# Patient Record
Sex: FEMALE | Race: WHITE | Employment: PART TIME
[De-identification: names, ages, dates, MRNs, and addresses within clinical notes are randomized per-mention and may not be internally consistent; named-entity substitution may affect disease eponyms.]

---

## 2017-01-25 ENCOUNTER — OFFICE VISIT (OUTPATIENT)
Dept: OBGYN | Facility: CLINIC | Age: 37
End: 2017-01-25

## 2017-01-25 VITALS
HEIGHT: 63 IN | DIASTOLIC BLOOD PRESSURE: 70 MMHG | WEIGHT: 120 LBS | BODY MASS INDEX: 21.26 KG/M2 | SYSTOLIC BLOOD PRESSURE: 118 MMHG

## 2017-01-25 DIAGNOSIS — N89.8 VAGINAL IRRITATION: Primary | ICD-10-CM

## 2017-01-25 PROCEDURE — 99214 OFFICE O/P EST MOD 30 MIN: CPT | Performed by: OBSTETRICS & GYNECOLOGY

## 2017-01-25 RX ORDER — CHOLECALCIFEROL (VITAMIN D3) 1250 MCG
1 CAPSULE ORAL WEEKLY
COMMUNITY

## 2017-01-25 ASSESSMENT — PATIENT HEALTH QUESTIONNAIRE - PHQ9
1. LITTLE INTEREST OR PLEASURE IN DOING THINGS: 1
SUM OF ALL RESPONSES TO PHQ9 QUESTIONS 1 & 2: 2
SUM OF ALL RESPONSES TO PHQ QUESTIONS 1-9: 2
2. FEELING DOWN, DEPRESSED OR HOPELESS: 1

## 2017-08-18 ENCOUNTER — TELEPHONE (OUTPATIENT)
Dept: OBGYN CLINIC | Age: 37
End: 2017-08-18

## 2017-08-22 ENCOUNTER — OFFICE VISIT (OUTPATIENT)
Dept: OBGYN CLINIC | Age: 37
End: 2017-08-22
Payer: COMMERCIAL

## 2017-08-22 VITALS
DIASTOLIC BLOOD PRESSURE: 82 MMHG | SYSTOLIC BLOOD PRESSURE: 104 MMHG | BODY MASS INDEX: 20.48 KG/M2 | HEIGHT: 63 IN | WEIGHT: 115.6 LBS

## 2017-08-22 DIAGNOSIS — N93.0 POSTCOITAL BLEEDING: Primary | ICD-10-CM

## 2017-08-22 PROCEDURE — 99212 OFFICE O/P EST SF 10 MIN: CPT | Performed by: NURSE PRACTITIONER

## 2017-08-29 ENCOUNTER — OFFICE VISIT (OUTPATIENT)
Dept: OBGYN CLINIC | Age: 37
End: 2017-08-29
Payer: COMMERCIAL

## 2017-08-29 VITALS
SYSTOLIC BLOOD PRESSURE: 114 MMHG | DIASTOLIC BLOOD PRESSURE: 82 MMHG | WEIGHT: 114.8 LBS | BODY MASS INDEX: 19.6 KG/M2 | HEIGHT: 64 IN

## 2017-08-29 DIAGNOSIS — Z01.419 ENCOUNTER FOR GYNECOLOGICAL EXAMINATION WITHOUT ABNORMAL FINDING: Primary | ICD-10-CM

## 2017-08-29 LAB — PAP SMEAR: NORMAL

## 2017-08-29 PROCEDURE — 99395 PREV VISIT EST AGE 18-39: CPT | Performed by: NURSE PRACTITIONER

## 2017-08-29 ASSESSMENT — ENCOUNTER SYMPTOMS
ABDOMINAL DISTENTION: 0
ABDOMINAL PAIN: 0
SHORTNESS OF BREATH: 0
CONSTIPATION: 0
COUGH: 0
DIARRHEA: 0
BACK PAIN: 0

## 2017-09-07 DIAGNOSIS — Z01.419 ENCOUNTER FOR GYNECOLOGICAL EXAMINATION WITHOUT ABNORMAL FINDING: ICD-10-CM

## 2017-09-12 ENCOUNTER — PROCEDURE VISIT (OUTPATIENT)
Dept: OBGYN CLINIC | Age: 37
End: 2017-09-12
Payer: COMMERCIAL

## 2017-09-12 DIAGNOSIS — N93.0 POSTCOITAL BLEEDING: ICD-10-CM

## 2017-09-12 PROCEDURE — 76830 TRANSVAGINAL US NON-OB: CPT | Performed by: OBSTETRICS & GYNECOLOGY

## 2017-09-12 PROCEDURE — 76856 US EXAM PELVIC COMPLETE: CPT | Performed by: OBSTETRICS & GYNECOLOGY

## 2017-09-19 ENCOUNTER — INITIAL CONSULT (OUTPATIENT)
Dept: OBGYN CLINIC | Age: 37
End: 2017-09-19
Payer: COMMERCIAL

## 2017-09-19 VITALS
WEIGHT: 118 LBS | DIASTOLIC BLOOD PRESSURE: 60 MMHG | BODY MASS INDEX: 20.14 KG/M2 | HEIGHT: 64 IN | SYSTOLIC BLOOD PRESSURE: 112 MMHG

## 2017-09-19 DIAGNOSIS — Z30.09 GENERAL COUNSELING AND ADVICE FOR CONTRACEPTIVE MANAGEMENT: Primary | ICD-10-CM

## 2017-09-19 DIAGNOSIS — N92.0 MENORRHAGIA WITH REGULAR CYCLE: ICD-10-CM

## 2017-09-19 PROCEDURE — 99213 OFFICE O/P EST LOW 20 MIN: CPT | Performed by: OBSTETRICS & GYNECOLOGY

## 2017-09-19 ASSESSMENT — ENCOUNTER SYMPTOMS
NAUSEA: 0
CONSTIPATION: 0
ABDOMINAL PAIN: 0
ORTHOPNEA: 0
DOUBLE VISION: 0
COUGH: 0
DIARRHEA: 0
VOMITING: 0
HEARTBURN: 0
WHEEZING: 0
BLURRED VISION: 0

## 2017-12-07 ENCOUNTER — OFFICE VISIT (OUTPATIENT)
Dept: OBGYN CLINIC | Age: 37
End: 2017-12-07
Payer: COMMERCIAL

## 2017-12-07 VITALS
HEIGHT: 64 IN | WEIGHT: 117 LBS | SYSTOLIC BLOOD PRESSURE: 130 MMHG | BODY MASS INDEX: 19.97 KG/M2 | DIASTOLIC BLOOD PRESSURE: 70 MMHG

## 2017-12-07 DIAGNOSIS — N76.0 ACUTE VAGINITIS: ICD-10-CM

## 2017-12-07 DIAGNOSIS — Z20.2 STD EXPOSURE: Primary | ICD-10-CM

## 2017-12-07 PROCEDURE — 99213 OFFICE O/P EST LOW 20 MIN: CPT | Performed by: OBSTETRICS & GYNECOLOGY

## 2017-12-08 ENCOUNTER — TELEPHONE (OUTPATIENT)
Dept: OBGYN CLINIC | Age: 37
End: 2017-12-08

## 2017-12-08 DIAGNOSIS — N76.0 ACUTE VAGINITIS: ICD-10-CM

## 2017-12-08 NOTE — TELEPHONE ENCOUNTER
gyn patient calling for result of vaginal culture from yesterday. Advised her no results back yet as her insurance must use [facility] and this can delay results. She wonders if \" cream\" could be called in while awaiting rsults as she is \" irritated\" on the outside .  Pharmacy confirmed patient number [phone]

## 2017-12-09 DIAGNOSIS — B37.31 YEAST VAGINITIS: Primary | ICD-10-CM

## 2017-12-09 RX ORDER — FLUCONAZOLE 150 MG/1
150 TABLET ORAL ONCE
Qty: 1 TABLET | Refills: 2 | Status: SHIPPED | OUTPATIENT
Start: 2017-12-09 | End: 2017-12-09

## 2017-12-11 ENCOUNTER — TELEPHONE (OUTPATIENT)
Dept: OBGYN CLINIC | Age: 37
End: 2017-12-11

## 2017-12-11 NOTE — TELEPHONE ENCOUNTER
The patient only took the [address] yesterday, so she might still see some some results from that. I also suggest that I need to see the rest of her labs which are not back yet, so I will wait and check back in with her after the other labs return.

## 2017-12-12 DIAGNOSIS — Z20.2 STD EXPOSURE: ICD-10-CM

## 2017-12-28 ENCOUNTER — INITIAL CONSULT (OUTPATIENT)
Dept: OBGYN CLINIC | Age: 37
End: 2017-12-28
Payer: COMMERCIAL

## 2017-12-28 VITALS
SYSTOLIC BLOOD PRESSURE: 116 MMHG | DIASTOLIC BLOOD PRESSURE: 70 MMHG | BODY MASS INDEX: 19.46 KG/M2 | HEIGHT: 64 IN | WEIGHT: 114 LBS

## 2017-12-28 DIAGNOSIS — N94.6 MENORRHALGIA: Primary | ICD-10-CM

## 2017-12-28 PROCEDURE — 99213 OFFICE O/P EST LOW 20 MIN: CPT | Performed by: OBSTETRICS & GYNECOLOGY

## 2017-12-28 NOTE — PROGRESS NOTES
Number of children: N/A    Years of education: N/A     Occupational History    Not on file. Social History Main Topics    Smoking status: Former Smoker    Smokeless tobacco: Never Used    Alcohol use No    Drug use: No    Sexual activity: No     Other Topics Concern    Not on file     Social History Narrative    No narrative on file         MEDICATIONS:  Current Outpatient Prescriptions   Medication Sig Dispense Refill    NONFORMULARY RUKHSANA 750mg      TAURINE PO Take by mouth      NONFORMULARY Honey capsuls      Ixekizumab (TALTZ SC) Inject into the skin      ST CARLOS WORT PO Take by mouth \"Positive Thoughts\"      Cholecalciferol (VITAMIN D3) 76051 UNITS CAPS Take 1 capsule by mouth once a week      ALPRAZolam (XANAX) 0.25 MG tablet       LORazepam (ATIVAN) 2 MG tablet Take 2 mg by mouth nightly      HYDROcodone-acetaminophen (NORCO) 7.5-325 MG per tablet   Take 1 tablet by mouth every 8 hours as needed Migraines       No current facility-administered medications for this visit. ALLERGIES:  Allergies as of 12/28/2017 - Review Complete 12/28/2017   Allergen Reaction Noted    Latex Hives 08/08/2012    Amoxicillin-pot clavulanate Hives 01/09/2012    Bactrim Hives 01/09/2012    Ciprofloxacin Hives 01/09/2012    Levofloxacin Hives 01/09/2012    Macrobid [nitrofurantoin monohydrate macrocrystals] Hives 01/09/2012         REVIEW OF SYSTEMS:  General appearance:Appears healthy. Alert; in no acute distress. Pleasant.   CARDIOVASCULAR: Denies: chest pain, dyspnea on exertion, palpitations  RESPIRATORY: Denies: cough, shortness of breath, wheezing  GI: Denies: abdominal pain, flank pain, nausea, vomiting, diarrhea  : Denies: dysuria, frequency/urgency, hematuria, pelvic pain;                                        MUSCULOSKELETAL: Denies: back pain, joint swelling, muscle pain  SKIN: Denies: rash or hives  NEUROLOGIC: Denies Headache, Migraines, CVA, TIA  HEMATOLOGIC: Denies Bleeding Disorder, Coagulopathy or Transfusion History    Blood pressure 116/70, height 5' 4\" (1.626 m), weight 114 lb (51.7 kg), last menstrual period 12/22/2017, not currently breastfeeding. General Appearance:  awake, alert, oriented, in no acute distress and well developed, well nourished  Skin:  Skin color, texture, turgor normal. No rashes or lesions. Mouth/Throat:  Mucosa moist.  No lesions. Pharynx without erythema, edema or exudate. Neck:  neck- supple, no mass, non-tender  Lungs:  Normal expansion. Clear to auscultation. No rales, rhonchi, or wheezing. Heart:  Heart sounds are normal.  Regular rate and rhythm without murmur, gallop or rub. Breast:  negative  Abdomen:  Soft, non-tender, normal bowel sounds. No bruits, organomegaly or masses. Extremities: Extremities warm to touch, pink, with no edema. Musculoskeletal:  negative  Peripheral Pulses:  Normal  Neurologic:  negative  Female Urogen:  External genitalia, vagina and cervix appear normal and without lesions. Bimanual exam: no adnexal masses, uterine enlargement or tenderness noted. Pap obtained previously. Diagnostics:  No results found.         Labs:  Results for orders placed or performed in visit on 09/07/17   PAP SMEAR   Result Value Ref Range    Pap Negative for intraephithelial lesion or malignancy Negative for intraephithelial lesion or malignancy, Other           ASSESSMENT:    1. Menorrhalgia           Patient Active Problem List    Diagnosis Date Noted    Psoriasis      Priority: High    Migraine headache 07/10/2012     Priority: Low     Takes Vicodin PRN      Osteoarthritis      Priority: Low    Elevated blood pressure reading 11/12/2014     Labetalol 200mg BID  Updating deleted diagnoses            Plan:  Novasure ablation, BPS by laparoscopy 1/2 at [facility]  Orders:    CBC:Yes                                                             NPO after midnight   Type & Screen:Yes  PAT Appointment:No  Anesthesia Protocol:

## 2017-12-29 ENCOUNTER — TELEPHONE (OUTPATIENT)
Dept: OBGYN CLINIC | Age: 37
End: 2017-12-29

## 2017-12-29 DIAGNOSIS — Z98.890 HISTORY OF CARDIAC RADIOFREQUENCY ABLATION: Primary | ICD-10-CM

## 2017-12-29 NOTE — TELEPHONE ENCOUNTER
Spoke with patient she is willing to travel if we can get echocardiogram done today. Spoke with daria at [address] scheduling she had to leave a message at [facility] . She then called [facility] to see if patient could be worked in today . They have absolutely no openings at [facility] or [facility] today. She did find an opening at 6 a m at [employer] . Patient to arrive at 7:30 am at er entrance [facility] wear comfortable clothes .  Patient notified and order faxed to [phone] with instructions to read ASAP as patient has surgery 01/02/18

## 2018-01-02 DIAGNOSIS — Z98.890 HISTORY OF CARDIAC RADIOFREQUENCY ABLATION: ICD-10-CM

## 2018-01-02 DIAGNOSIS — Z20.2 STD EXPOSURE: ICD-10-CM

## 2018-01-04 DIAGNOSIS — Z20.2 STD EXPOSURE: ICD-10-CM

## 2018-01-05 ENCOUNTER — TELEPHONE (OUTPATIENT)
Dept: OBGYN CLINIC | Age: 38
End: 2018-01-05

## 2018-01-05 NOTE — TELEPHONE ENCOUNTER
post op tubal ligation, ablation 01/02/18 with Dr [employer] calling with concern that one of her icisions is not closed like the other , denies drainage . Slightly red around this one incision. She did have a temperature of 100.1 this morning but states her dad is sicj=k with URI.  Post op appt 01/15/18 please advise [phone]

## 2018-01-08 ENCOUNTER — TELEPHONE (OUTPATIENT)
Dept: OBGYN CLINIC | Age: 38
End: 2018-01-08

## 2018-01-08 DIAGNOSIS — R30.0 DYSURIA: Primary | ICD-10-CM

## 2018-01-08 DIAGNOSIS — R30.0 DYSURIA: ICD-10-CM

## 2018-01-08 NOTE — TELEPHONE ENCOUNTER
Patient will need to be seen for an Affirm. Either tomorrow with one of the other providers or Wednesday with me.   Thanks

## 2018-01-08 NOTE — TELEPHONE ENCOUNTER
post op tubal ligation, ablation c/o irritation on the outside \" like a yeast infection\" states when the discharge hits the irritation on the outside of her vagina it hurts \" terrible\". Order faxed to [facility] lab so patient can collect [facility] urine .  Please advise [phone]

## 2018-01-09 ENCOUNTER — OFFICE VISIT (OUTPATIENT)
Dept: OBGYN CLINIC | Age: 38
End: 2018-01-09

## 2018-01-09 VITALS
SYSTOLIC BLOOD PRESSURE: 120 MMHG | HEIGHT: 64 IN | BODY MASS INDEX: 19.63 KG/M2 | DIASTOLIC BLOOD PRESSURE: 80 MMHG | WEIGHT: 115 LBS

## 2018-01-09 DIAGNOSIS — N89.8 VAGINAL DISCHARGE: Primary | ICD-10-CM

## 2018-01-09 PROCEDURE — 99024 POSTOP FOLLOW-UP VISIT: CPT | Performed by: OBSTETRICS & GYNECOLOGY

## 2018-01-09 RX ORDER — IBUPROFEN 800 MG/1
TABLET ORAL
Refills: 5 | COMMUNITY
Start: 2018-01-02 | End: 2018-01-09 | Stop reason: CLARIF

## 2018-01-09 RX ORDER — ALPRAZOLAM 1 MG/1
TABLET ORAL
Refills: 0 | COMMUNITY
Start: 2017-12-23

## 2018-01-09 RX ORDER — IBUPROFEN 800 MG/1
800 TABLET ORAL
COMMUNITY
Start: 2018-01-02 | End: 2018-02-01

## 2018-01-09 RX ORDER — MAGNESIUM GLUCONATE 30 MG(550)
30 TABLET ORAL
COMMUNITY

## 2018-01-09 ASSESSMENT — ENCOUNTER SYMPTOMS
HEARTBURN: 0
ABDOMINAL PAIN: 0
NAUSEA: 0
DIARRHEA: 0
WHEEZING: 0
DOUBLE VISION: 0
CONSTIPATION: 0
VOMITING: 0
BLURRED VISION: 0
ORTHOPNEA: 0
COUGH: 0

## 2018-01-09 NOTE — PROGRESS NOTES
[facility]  [facility]  DATE OF VISIT:  18    Joni Cunningham    :  1980  CHIEF COMPLAINT:    Chief Complaint   Patient presents with    Vaginitis     discharge and irritation        HPI :   Joni Cunningham is a 40 y.o. female here for vaginal discharge and itching. She underwent laparoscopic tubal ligation and endometrial ablation with Dr. Melani Li on 18. She had a urinalysis yesterday which appeared negative for infection. She reports vaginal discomfort as well. Past Medical History:   Diagnosis Date    Arthritis     History of vaginal bleeding     constant bled for 5 yrs cause unknown    Migraine headache 7/10/2012    Osteoarthritis     with rheamuatoid arthritis    Ovarian cyst     Psoriasis     Renal lithiasis     Hx of kidney stones    Tachycardia     acute tachycardia. Had 2 ablations    Thyroid disease     treated as teen.   No problem since then    Varicosities                                                                    Past Surgical History:   Procedure Laterality Date    APPENDECTOMY      CARDIAC SURGERY      x 2 for tacycardia resting heart rate now      SECTION, LOW TRANSVERSE  9/15/12    FOOT SURGERY      for osteoarthritis metal plates in both feet    LAPAROSCOPY      ? endometriosis for constant vaginal bleeding    LAPAROSCOPY  2018    crystal partial salpingectomy, NovaSure ablation, hysteroscopy     Family History   Problem Relation Age of Onset    Hypertension Paternal Grandmother     Hypertension Father     High Cholesterol Father     Stroke Father     Heart Disease Father     Other Father      bladder issues, prostates    Hypertension Mother     Depression Mother     Thyroid Disease Mother     Cancer Other      thyroid mets    Other Maternal Grandfather      blood disorder     History   Smoking Status    Former Smoker   Smokeless Tobacco    Never Used     History   Alcohol Use No     Current Outpatient Prescriptions   Medication Sig Dispense Refill    ibuprofen (ADVIL;MOTRIN) 800 MG tablet Take 800 mg by mouth      Ixekizumab 80 MG/ML SOAJ Inject into the skin      Magnesium Gluconate 550 MG TABS Take 30 mg by mouth      Taurine 1000 MG CAPS Take by mouth      ALPRAZolam (XANAX) 1 MG tablet TK 1 AND 1/2 TO 2 TS PO QID  0    NONFORMULARY RUKHSANA 750mg      NONFORMULARY Honey capsuls      ST CARLOS WORT PO Take by mouth \"Positive Thoughts\"      Cholecalciferol (VITAMIN D3) 13885 UNITS CAPS Take 1 capsule by mouth once a week      LORazepam (ATIVAN) 2 MG tablet Take 2 mg by mouth nightly      HYDROcodone-acetaminophen (NORCO) 7.5-325 MG per tablet   Take 1 tablet by mouth every 8 hours as needed Migraines       No current facility-administered medications for this visit. Allergies:  Latex; Amoxicillin-pot clavulanate; Bactrim; Ciprofloxacin; Levofloxacin; and Macrobid [nitrofurantoin monohydrate macrocrystals]    Gynecologic History:  Patient's last menstrual period was 2017 (exact date). Sexually Active: Yes  STD History: No      Obstetric History       T2      L2     SAB0   TAB0   Ectopic0   Molar0   Multiple0   Live Births2        Review of Systems   Constitutional: Negative for chills, fever and weight loss. HENT: Negative for hearing loss. Eyes: Negative for blurred vision and double vision. Respiratory: Negative for cough and wheezing. Cardiovascular: Negative for chest pain, palpitations and orthopnea. Gastrointestinal: Negative for abdominal pain, constipation, diarrhea, heartburn, nausea and vomiting. Genitourinary: Negative for dysuria, frequency and urgency. Musculoskeletal: Negative for joint pain and myalgias. Skin: Negative for rash. Neurological: Negative for dizziness, tingling, focal weakness, weakness and headaches. Endo/Heme/Allergies: Does not bruise/bleed easily.    Psychiatric/Behavioral: Negative for depression, substance abuse

## 2018-01-10 DIAGNOSIS — N89.8 VAGINAL DISCHARGE: ICD-10-CM

## 2018-01-10 RX ORDER — FLUCONAZOLE 150 MG/1
150 TABLET ORAL ONCE
Qty: 3 TABLET | Refills: 0 | Status: SHIPPED | OUTPATIENT
Start: 2018-01-10 | End: 2018-01-10

## 2018-01-10 RX ORDER — METRONIDAZOLE 500 MG/1
500 TABLET ORAL 2 TIMES DAILY
Qty: 14 TABLET | Refills: 0 | Status: SHIPPED | OUTPATIENT
Start: 2018-01-10 | End: 2018-05-24 | Stop reason: SDUPTHER

## 2018-01-18 ENCOUNTER — OFFICE VISIT (OUTPATIENT)
Dept: OBGYN CLINIC | Age: 38
End: 2018-01-18

## 2018-01-18 VITALS
SYSTOLIC BLOOD PRESSURE: 110 MMHG | BODY MASS INDEX: 19.43 KG/M2 | WEIGHT: 113.8 LBS | HEIGHT: 64 IN | DIASTOLIC BLOOD PRESSURE: 60 MMHG

## 2018-01-18 DIAGNOSIS — Z09 POSTOP CHECK: Primary | ICD-10-CM

## 2018-01-18 PROCEDURE — 99024 POSTOP FOLLOW-UP VISIT: CPT | Performed by: OBSTETRICS & GYNECOLOGY
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
## 2018-01-22 ENCOUNTER — TELEPHONE (OUTPATIENT)
Dept: OBGYN CLINIC | Age: 38
End: 2018-01-22

## 2018-03-15 ENCOUNTER — TELEPHONE (OUTPATIENT)
Dept: OBGYN CLINIC | Age: 38
End: 2018-03-15

## 2018-03-16 ENCOUNTER — OFFICE VISIT (OUTPATIENT)
Dept: OBGYN CLINIC | Age: 38
End: 2018-03-16
Payer: COMMERCIAL

## 2018-03-16 VITALS
HEIGHT: 64 IN | BODY MASS INDEX: 20.32 KG/M2 | WEIGHT: 119 LBS | DIASTOLIC BLOOD PRESSURE: 84 MMHG | SYSTOLIC BLOOD PRESSURE: 138 MMHG

## 2018-03-16 DIAGNOSIS — R19.09 LUMP IN THE GROIN: Primary | ICD-10-CM

## 2018-03-16 PROCEDURE — 99213 OFFICE O/P EST LOW 20 MIN: CPT | Performed by: NURSE PRACTITIONER

## 2018-03-16 NOTE — PROGRESS NOTES
S-  Here for painful lump in right groin that she noticed about 4 days ago. It is painful to touch and when she puts pressure on it or steps down with any force. It has not changed in size or character and she has not used anything for pain relief. She admits to having \"cold-like\" symptoms for a week or so. O-  Physical Exam   Constitutional: She is oriented to person, place, and time. She appears well-developed and well-nourished. HENT:   Head: Normocephalic and atraumatic. Eyes: Conjunctivae and EOM are normal.   Neck: Normal range of motion. Neck supple. Cardiovascular: Normal rate and regular rhythm. Pulmonary/Chest: Effort normal and breath sounds normal.   Abdominal: Soft. Musculoskeletal: Normal range of motion. Lymphadenopathy:     She has no cervical adenopathy. Neurological: She is alert and oriented to person, place, and time. Skin: Skin is warm and dry. Psychiatric: She has a normal mood and affect. Her behavior is normal. Judgment and thought content normal.     A-  Patient is a 40 y.o. Belinda Page  seen with total face to face time of 15 minutes. More than 50% of this visit was on counseling and education regarding her   1. Lump in the groin      and her options. She was also counseled on her preventative health maintenance recommendations and follow-up of annual exam. Refer to plan and assessment.     Recent Results (from the past 8736 hour(s))   PAP SMEAR    Collection Time: 08/29/17 12:00 AM   Result Value Ref Range    Pap Negative for intraephithelial lesion or malignancy Negative for intraephithelial lesion or malignancy, Other       P-  Monitor for changes in size, character, increase in pain  May consider US with changes  Warm compresses and ibuprofen for comfort  FU PCP for worsening cold symptoms  RTO annual exam and prn

## 2018-05-16 ENCOUNTER — OFFICE VISIT (OUTPATIENT)
Dept: OBGYN CLINIC | Age: 38
End: 2018-05-16
Payer: COMMERCIAL

## 2018-05-16 VITALS
BODY MASS INDEX: 21.44 KG/M2 | SYSTOLIC BLOOD PRESSURE: 118 MMHG | WEIGHT: 121 LBS | HEIGHT: 63 IN | DIASTOLIC BLOOD PRESSURE: 78 MMHG

## 2018-05-16 DIAGNOSIS — N76.0 ACUTE VAGINITIS: Primary | ICD-10-CM

## 2018-05-16 PROCEDURE — 99213 OFFICE O/P EST LOW 20 MIN: CPT | Performed by: NURSE PRACTITIONER

## 2018-05-17 DIAGNOSIS — N76.0 ACUTE VAGINITIS: ICD-10-CM

## 2018-05-21 ENCOUNTER — TELEPHONE (OUTPATIENT)
Dept: OBGYN CLINIC | Age: 38
End: 2018-05-21

## 2018-05-23 ENCOUNTER — OFFICE VISIT (OUTPATIENT)
Dept: OBGYN CLINIC | Age: 38
End: 2018-05-23
Payer: COMMERCIAL

## 2018-05-23 VITALS — WEIGHT: 124.6 LBS | DIASTOLIC BLOOD PRESSURE: 70 MMHG | SYSTOLIC BLOOD PRESSURE: 102 MMHG | BODY MASS INDEX: 22.07 KG/M2

## 2018-05-23 DIAGNOSIS — N89.8 VAGINAL ODOR: Primary | ICD-10-CM

## 2018-05-23 PROCEDURE — 99213 OFFICE O/P EST LOW 20 MIN: CPT | Performed by: NURSE PRACTITIONER

## 2018-05-24 DIAGNOSIS — N89.8 VAGINAL ODOR: ICD-10-CM

## 2018-05-24 RX ORDER — METRONIDAZOLE 500 MG/1
500 TABLET ORAL 2 TIMES DAILY
Qty: 14 TABLET | Refills: 0 | Status: SHIPPED | OUTPATIENT
Start: 2018-05-24 | End: 2018-05-31

## 2018-05-25 DIAGNOSIS — N89.8 VAGINAL ODOR: ICD-10-CM

## 2018-05-29 ENCOUNTER — TELEPHONE (OUTPATIENT)
Dept: OBGYN CLINIC | Age: 38
End: 2018-05-29

## 2018-05-29 RX ORDER — METRONIDAZOLE 7.5 MG/G
GEL VAGINAL
Qty: 1 TUBE | Refills: 0 | Status: SHIPPED | OUTPATIENT
Start: 2018-05-29 | End: 2018-06-05

## 2018-06-01 ENCOUNTER — OFFICE VISIT (OUTPATIENT)
Dept: OBGYN CLINIC | Age: 38
End: 2018-06-01
Payer: COMMERCIAL

## 2018-06-01 ENCOUNTER — TELEPHONE (OUTPATIENT)
Dept: OBGYN CLINIC | Age: 38
End: 2018-06-01

## 2018-06-01 VITALS
DIASTOLIC BLOOD PRESSURE: 64 MMHG | BODY MASS INDEX: 21.58 KG/M2 | HEIGHT: 63 IN | WEIGHT: 121.8 LBS | SYSTOLIC BLOOD PRESSURE: 102 MMHG

## 2018-06-01 DIAGNOSIS — N76.0 ACUTE VAGINITIS: ICD-10-CM

## 2018-06-01 DIAGNOSIS — R82.90 ABNORMAL URINE ODOR: Primary | ICD-10-CM

## 2018-06-01 PROCEDURE — 99213 OFFICE O/P EST LOW 20 MIN: CPT | Performed by: NURSE PRACTITIONER

## 2018-06-04 ENCOUNTER — TELEPHONE (OUTPATIENT)
Dept: OBGYN CLINIC | Age: 38
End: 2018-06-04

## 2018-06-04 DIAGNOSIS — N76.0 ACUTE VAGINITIS: ICD-10-CM

## 2018-06-04 DIAGNOSIS — R82.90 ABNORMAL URINE ODOR: ICD-10-CM

## 2018-06-13 ENCOUNTER — OFFICE VISIT (OUTPATIENT)
Dept: OBGYN CLINIC | Age: 38
End: 2018-06-13
Payer: COMMERCIAL

## 2018-06-13 VITALS
SYSTOLIC BLOOD PRESSURE: 118 MMHG | BODY MASS INDEX: 21.44 KG/M2 | WEIGHT: 121 LBS | DIASTOLIC BLOOD PRESSURE: 60 MMHG | HEIGHT: 63 IN

## 2018-06-13 DIAGNOSIS — N39.0 URINARY TRACT INFECTION WITHOUT HEMATURIA, SITE UNSPECIFIED: Primary | ICD-10-CM

## 2018-06-13 PROCEDURE — 99212 OFFICE O/P EST SF 10 MIN: CPT | Performed by: NURSE PRACTITIONER

## 2018-06-19 LAB
BILIRUBIN URINE: 0 MG/DL
BLOOD, URINE: NEGATIVE
CLARITY: NORMAL
COLOR: NORMAL
GLUCOSE URINE: NEGATIVE
KETONES, URINE: NEGATIVE
LEUKOCYTE ESTERASE, URINE: NEGATIVE
NITRITE, URINE: NEGATIVE
PH UA: 6.5 (ref 4.5–8)
PROTEIN UA: NEGATIVE
SPECIFIC GRAVITY UA: 1.03 (ref 1–1.03)
UROBILINOGEN, URINE: NORMAL

## 2018-06-21 DIAGNOSIS — N39.0 URINARY TRACT INFECTION WITHOUT HEMATURIA, SITE UNSPECIFIED: ICD-10-CM

## 2018-06-28 ENCOUNTER — OFFICE VISIT (OUTPATIENT)
Dept: OBGYN CLINIC | Age: 38
End: 2018-06-28
Payer: COMMERCIAL

## 2018-06-28 VITALS
DIASTOLIC BLOOD PRESSURE: 86 MMHG | HEIGHT: 61 IN | BODY MASS INDEX: 24.24 KG/M2 | WEIGHT: 128.4 LBS | SYSTOLIC BLOOD PRESSURE: 100 MMHG

## 2018-06-28 DIAGNOSIS — N76.0 ACUTE VAGINITIS: Primary | ICD-10-CM

## 2018-06-28 LAB
BILIRUBIN URINE: 0 MG/DL
BLOOD, URINE: NEGATIVE
CLARITY: CLEAR
COLOR: YELLOW
GLUCOSE URINE: NEGATIVE
KETONES, URINE: NEGATIVE
LEUKOCYTE ESTERASE, URINE: NEGATIVE
NITRITE, URINE: NEGATIVE
PH UA: 6 (ref 4.5–8)
PROTEIN UA: NEGATIVE
SPECIFIC GRAVITY UA: 1.01 (ref 1–1.03)
UROBILINOGEN, URINE: NORMAL

## 2018-06-28 PROCEDURE — 81003 URINALYSIS AUTO W/O SCOPE: CPT | Performed by: NURSE PRACTITIONER

## 2018-06-28 PROCEDURE — 99213 OFFICE O/P EST LOW 20 MIN: CPT | Performed by: NURSE PRACTITIONER

## 2018-06-29 DIAGNOSIS — N76.0 ACUTE VAGINITIS: ICD-10-CM

## 2018-06-29 RX ORDER — FLUCONAZOLE 150 MG/1
150 TABLET ORAL ONCE
Qty: 1 TABLET | Refills: 0 | Status: SHIPPED | OUTPATIENT
Start: 2018-06-29 | End: 2018-07-02 | Stop reason: SDUPTHER

## 2018-07-02 ENCOUNTER — TELEPHONE (OUTPATIENT)
Dept: OBGYN CLINIC | Age: 38
End: 2018-07-02

## 2018-07-02 RX ORDER — FLUCONAZOLE 150 MG/1
150 TABLET ORAL ONCE
Qty: 1 TABLET | Refills: 0 | Status: SHIPPED | OUTPATIENT
Start: 2018-07-02 | End: 2018-07-02

## 2018-07-02 NOTE — TELEPHONE ENCOUNTER
I called and let Mani Mcclure know that another diflucan was sent along with a cream and gave her directions on use.

## 2018-07-02 NOTE — TELEPHONE ENCOUNTER
I sent another diflucan and also some kenalog cream that she can use twice daily x 7 days then once daily if still bothering her

## 2018-07-06 ENCOUNTER — TELEPHONE (OUTPATIENT)
Dept: OBGYN CLINIC | Age: 38
End: 2018-07-06

## 2018-07-06 RX ORDER — ESTRADIOL 0.1 MG/G
1 CREAM VAGINAL DAILY
Qty: 1 TUBE | Refills: 3 | Status: SHIPPED | OUTPATIENT
Start: 2018-07-06 | End: 2018-10-15 | Stop reason: ALTCHOICE

## 2018-07-06 NOTE — TELEPHONE ENCOUNTER
I sent estrace vaginal cream.  If not better in 3-4 weeks, she needs to be seen in the office.   She may continue to use the kenalog

## 2018-08-02 ENCOUNTER — TELEPHONE (OUTPATIENT)
Dept: OBGYN CLINIC | Age: 38
End: 2018-08-02

## 2018-08-02 NOTE — TELEPHONE ENCOUNTER
Pt has quite a bit of itching, burning. All of this is internal. It has just started within the past 3 days. She also stated that she needs at least 2-3 Diflucan to completely knock out a yeast inf when she does get them. She asked for them to be called in if possible to the [facility] on [address] advise pt plan of care.

## 2018-08-03 RX ORDER — FLUCONAZOLE 150 MG/1
150 TABLET ORAL
Qty: 2 TABLET | Refills: 0 | Status: SHIPPED | OUTPATIENT
Start: 2018-08-03 | End: 2018-09-12 | Stop reason: SDUPTHER

## 2018-08-30 ENCOUNTER — OFFICE VISIT (OUTPATIENT)
Dept: OBGYN CLINIC | Age: 38
End: 2018-08-30
Payer: COMMERCIAL

## 2018-08-30 VITALS
DIASTOLIC BLOOD PRESSURE: 84 MMHG | WEIGHT: 129.2 LBS | BODY MASS INDEX: 24.39 KG/M2 | HEIGHT: 61 IN | SYSTOLIC BLOOD PRESSURE: 122 MMHG

## 2018-08-30 DIAGNOSIS — N89.8 VAGINA ITCHING: Primary | ICD-10-CM

## 2018-08-30 PROCEDURE — 99213 OFFICE O/P EST LOW 20 MIN: CPT | Performed by: NURSE PRACTITIONER

## 2018-09-04 ENCOUNTER — TELEPHONE (OUTPATIENT)
Dept: OBGYN CLINIC | Age: 38
End: 2018-09-04

## 2018-09-04 DIAGNOSIS — N89.8 VAGINA ITCHING: ICD-10-CM

## 2018-09-04 RX ORDER — FLUCONAZOLE 150 MG/1
150 TABLET ORAL ONCE
Qty: 1 TABLET | Refills: 0 | Status: SHIPPED | OUTPATIENT
Start: 2018-09-04 | End: 2018-09-04

## 2018-09-04 NOTE — TELEPHONE ENCOUNTER
Pt calling for test request and notified pt that yeast+. Pt states one Diflucan is never enough for her. Pt requesting to have 2 or 3 called in for treatment.

## 2018-09-06 ENCOUNTER — TELEPHONE (OUTPATIENT)
Dept: OBGYN CLINIC | Age: 38
End: 2018-09-06

## 2018-09-06 RX ORDER — FLUCONAZOLE 150 MG/1
150 TABLET ORAL WEEKLY
Qty: 4 TABLET | Refills: 0 | Status: SHIPPED | OUTPATIENT
Start: 2018-09-06 | End: 2018-09-28

## 2018-09-12 ENCOUNTER — OFFICE VISIT (OUTPATIENT)
Dept: OBGYN CLINIC | Age: 38
End: 2018-09-12
Payer: COMMERCIAL

## 2018-09-12 VITALS
SYSTOLIC BLOOD PRESSURE: 118 MMHG | HEIGHT: 61 IN | BODY MASS INDEX: 23.9 KG/M2 | WEIGHT: 126.6 LBS | DIASTOLIC BLOOD PRESSURE: 80 MMHG

## 2018-09-12 DIAGNOSIS — B37.31 YEAST VAGINITIS: ICD-10-CM

## 2018-09-12 DIAGNOSIS — Z01.419 VISIT FOR GYNECOLOGIC EXAMINATION: Primary | ICD-10-CM

## 2018-09-12 PROCEDURE — 99395 PREV VISIT EST AGE 18-39: CPT | Performed by: OBSTETRICS & GYNECOLOGY

## 2018-09-12 ASSESSMENT — ENCOUNTER SYMPTOMS
DIARRHEA: 0
BACK PAIN: 0
COUGH: 0
SHORTNESS OF BREATH: 0
ABDOMINAL PAIN: 0
CONSTIPATION: 0
ABDOMINAL DISTENTION: 0
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
## 2018-09-14 DIAGNOSIS — B37.31 YEAST VAGINITIS: ICD-10-CM

## 2018-09-19 ENCOUNTER — TELEPHONE (OUTPATIENT)
Dept: OBGYN CLINIC | Age: 38
End: 2018-09-19

## 2018-09-19 DIAGNOSIS — N76.0 BV (BACTERIAL VAGINOSIS): Primary | ICD-10-CM

## 2018-09-19 DIAGNOSIS — B96.89 BV (BACTERIAL VAGINOSIS): Primary | ICD-10-CM

## 2018-09-19 RX ORDER — METRONIDAZOLE 7.5 MG/G
GEL VAGINAL
Qty: 1 TUBE | Refills: 0 | Status: SHIPPED | OUTPATIENT
Start: 2018-09-19 | End: 2018-09-26

## 2018-10-15 ENCOUNTER — PROCEDURE VISIT (OUTPATIENT)
Dept: OBGYN CLINIC | Age: 38
End: 2018-10-15
Payer: COMMERCIAL

## 2018-10-15 VITALS
HEIGHT: 61 IN | DIASTOLIC BLOOD PRESSURE: 72 MMHG | SYSTOLIC BLOOD PRESSURE: 110 MMHG | WEIGHT: 131.4 LBS | BODY MASS INDEX: 24.81 KG/M2

## 2018-10-15 DIAGNOSIS — R87.810 ASCUS WITH POSITIVE HIGH RISK HPV CERVICAL: Primary | ICD-10-CM

## 2018-10-15 DIAGNOSIS — R87.610 ASCUS WITH POSITIVE HIGH RISK HPV CERVICAL: Primary | ICD-10-CM

## 2018-10-15 DIAGNOSIS — N89.8 VAGINAL DISCHARGE: ICD-10-CM

## 2018-10-15 PROCEDURE — 57455 BIOPSY OF CERVIX W/SCOPE: CPT | Performed by: OBSTETRICS & GYNECOLOGY

## 2018-10-15 NOTE — PROGRESS NOTES
COLPOSCOPY PROCEDURE    INDICATIONS:  ASCUS pap  We will also do the ESTRELLITA for BV (she will be losing her insurance coverage b/o divorce)     HPV:   positive      Abnormal Cytology and Colposcopy History: No previous dysplasia    COLPOSCOPIC EXAMINATION:                Blood pressure 110/72, height 5' 1\" (1.549 m), weight 131 lb 6.4 oz (59.6 kg), not currently breastfeeding. Gross observations:  Wide white area at 6:00, some change at 1:00  Satisfactory: Yes     FINDINGS: 6:00 most likely acetowhite     ECC performed:  No     Acetic acid applied:   Yes       IMPRESSIONS: expect mild changes  Biopsy sites: 1:00, 6:00

## 2018-10-16 DIAGNOSIS — N89.8 VAGINAL DISCHARGE: ICD-10-CM

## 2018-10-19 DIAGNOSIS — R87.610 ASCUS WITH POSITIVE HIGH RISK HPV CERVICAL: ICD-10-CM

## 2018-10-19 DIAGNOSIS — R87.810 ASCUS WITH POSITIVE HIGH RISK HPV CERVICAL: ICD-10-CM

## 2018-10-29 ENCOUNTER — TELEPHONE (OUTPATIENT)
Dept: OBGYN CLINIC | Age: 38
End: 2018-10-29

## 2018-10-29 NOTE — TELEPHONE ENCOUNTER
patient calling for results of cervical biopsies. her voice mail is full. 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 She wonders when she should return for repeat pap 3 months or 6 months? She also wanted to let Dr Mary Anne Madsen know she is on an immuno suppressant \" talz\" every 30 day by injection for her psoriasis.  Wonders if this will affect her fighting off HPV     Please call her at [phone]

## 2019-02-14 ENCOUNTER — OFFICE VISIT (OUTPATIENT)
Dept: OBGYN CLINIC | Age: 39
End: 2019-02-14
Payer: COMMERCIAL

## 2019-02-14 VITALS
WEIGHT: 131 LBS | HEIGHT: 61 IN | DIASTOLIC BLOOD PRESSURE: 84 MMHG | SYSTOLIC BLOOD PRESSURE: 122 MMHG | BODY MASS INDEX: 24.73 KG/M2

## 2019-02-14 DIAGNOSIS — N63.23 BREAST LUMP ON LEFT SIDE AT 4 O'CLOCK POSITION: ICD-10-CM

## 2019-02-14 DIAGNOSIS — N63.20 LEFT BREAST LUMP: Primary | ICD-10-CM

## 2019-02-14 DIAGNOSIS — R82.90 ABNORMAL URINE ODOR: ICD-10-CM

## 2019-02-14 DIAGNOSIS — R30.0 DYSURIA: ICD-10-CM

## 2019-02-14 DIAGNOSIS — N89.8 VAGINAL ODOR: Primary | ICD-10-CM

## 2019-02-14 LAB
BILIRUBIN URINE: 0 MG/DL
BLOOD, URINE: NEGATIVE
CHLAMYDIA TRACHOMATIS AMPLIFIED DET: NEGATIVE
CLARITY: NORMAL
COLOR: YELLOW
GLUCOSE URINE: NEGATIVE
KETONES, URINE: NEGATIVE
LEUKOCYTE ESTERASE, URINE: NEGATIVE
N GONORRHOEAE AMPLIFIED DET: NEGATIVE
NITRITE, URINE: NEGATIVE
PH UA: 7.5 (ref 4.5–8)
PROTEIN UA: NEGATIVE
SPECIFIC GRAVITY UA: 1.02 (ref 1–1.03)
UROBILINOGEN, URINE: NORMAL

## 2019-02-14 PROCEDURE — 81003 URINALYSIS AUTO W/O SCOPE: CPT | Performed by: NURSE PRACTITIONER

## 2019-02-14 PROCEDURE — 99213 OFFICE O/P EST LOW 20 MIN: CPT | Performed by: NURSE PRACTITIONER

## 2019-02-15 DIAGNOSIS — R82.90 ABNORMAL URINE ODOR: ICD-10-CM

## 2019-02-15 DIAGNOSIS — N89.8 VAGINAL ODOR: ICD-10-CM

## 2019-02-15 DIAGNOSIS — R30.0 DYSURIA: ICD-10-CM

## 2019-02-15 RX ORDER — METRONIDAZOLE 7.5 MG/G
GEL VAGINAL
Qty: 1 TUBE | Refills: 2 | Status: SHIPPED | OUTPATIENT
Start: 2019-02-15 | End: 2020-07-07 | Stop reason: ALTCHOICE

## 2019-02-15 RX ORDER — CLINDAMYCIN HYDROCHLORIDE 300 MG/1
300 CAPSULE ORAL 3 TIMES DAILY
Qty: 30 CAPSULE | Refills: 0 | Status: SHIPPED | OUTPATIENT
Start: 2019-02-15 | End: 2019-02-25

## 2019-02-18 DIAGNOSIS — N89.8 VAGINAL ODOR: ICD-10-CM

## 2019-02-19 ENCOUNTER — TELEPHONE (OUTPATIENT)
Dept: OBGYN CLINIC | Age: 39
End: 2019-02-19

## 2019-02-22 ENCOUNTER — TELEPHONE (OUTPATIENT)
Dept: OBGYN CLINIC | Age: 39
End: 2019-02-22

## 2019-02-26 DIAGNOSIS — N63.20 LEFT BREAST LUMP: ICD-10-CM

## 2019-02-26 DIAGNOSIS — N63.23 BREAST LUMP ON LEFT SIDE AT 4 O'CLOCK POSITION: ICD-10-CM

## 2019-03-18 ENCOUNTER — TELEPHONE (OUTPATIENT)
Dept: OBGYN CLINIC | Age: 39
End: 2019-03-18

## 2019-03-18 RX ORDER — FLUCONAZOLE 150 MG/1
150 TABLET ORAL ONCE
Qty: 1 TABLET | Refills: 0 | Status: SHIPPED | OUTPATIENT
Start: 2019-03-18 | End: 2019-03-18

## 2019-06-11 ENCOUNTER — OFFICE VISIT (OUTPATIENT)
Dept: OBGYN CLINIC | Age: 39
End: 2019-06-11
Payer: COMMERCIAL

## 2019-06-11 VITALS
WEIGHT: 128 LBS | BODY MASS INDEX: 24.17 KG/M2 | DIASTOLIC BLOOD PRESSURE: 78 MMHG | HEIGHT: 61 IN | SYSTOLIC BLOOD PRESSURE: 118 MMHG

## 2019-06-11 DIAGNOSIS — R35.0 FREQUENT URINATION: ICD-10-CM

## 2019-06-11 DIAGNOSIS — N89.8 VAGINAL DISCHARGE: Primary | ICD-10-CM

## 2019-06-11 DIAGNOSIS — R30.0 BURNING WITH URINATION: ICD-10-CM

## 2019-06-11 PROCEDURE — 99213 OFFICE O/P EST LOW 20 MIN: CPT | Performed by: OBSTETRICS & GYNECOLOGY

## 2019-06-11 RX ORDER — NITROFURANTOIN 25; 75 MG/1; MG/1
100 CAPSULE ORAL 2 TIMES DAILY
Qty: 20 CAPSULE | Refills: 0 | Status: SHIPPED | OUTPATIENT
Start: 2019-06-11 | End: 2019-06-21

## 2019-06-11 NOTE — PROGRESS NOTES
Dilia Stephens is being seen for   Chief Complaint   Patient presents with   Claros Vaginal Discharge     Here for vaginal discharge and freq. urination with burning        HPI:The patient is a 44 y.o. Debroah Stains, seen today regarding BV, recurrent and frequent urination. Prior u/a was normal.  Today in office dip stick showed blood. Given patient's symptoms, will start on antibiotic and she will call in 48 hours to see if she needs to continue them . Has a history of BV. Post-coital odor was horrible a few days ago; less symptomatic now but wanted to be tested. Was treated with po Flagyl previously and had hives but tolerated metrogel vaginal.  Patient has psoriasis and has been on Humira. Thinks that it's immunologic effect is contributing to her BV problems. She recently discontinued the Humira and will be doing phototherapy instead. Will see if vaginal infection symptoms improved. Advised condoms but she is allergic to latex. Will try withdrawal.  Will likely help at least some. HPI    Vital Signs  /78 (Site: Right Upper Arm, Position: Sitting, Cuff Size: Medium Adult)   Ht 5' 1\" (1.549 m)   Wt 128 lb (58.1 kg)   LMP 06/06/2019   BMI 24.19 kg/m²   No results found for this or any previous visit (from the past 2016 hour(s)). OBGyn Exam      Pelvic: Vulva and vagina appear normal. Bimanual exam reveals normal uterus and adnexa. Assessment:   Diagnosis Orders   1. Vaginal discharge  VAGINITIS DNA PROBE    C.trachomatis N.gonorrhoeae DNA   2. Frequent urination  Urinalysis    Urine Culture   3. Burning with urination  Urinalysis    Urine Culture         PLAN:  Macrobid; await cultures and BD Affirm. Return to the office prn . Patient was seen with total face to face time of 20 minutes.

## 2019-06-13 ENCOUNTER — TELEPHONE (OUTPATIENT)
Dept: OBGYN CLINIC | Age: 39
End: 2019-06-13

## 2019-06-13 DIAGNOSIS — R30.0 BURNING WITH URINATION: ICD-10-CM

## 2019-06-13 DIAGNOSIS — N89.8 VAGINAL DISCHARGE: ICD-10-CM

## 2019-06-13 DIAGNOSIS — R35.0 FREQUENT URINATION: ICD-10-CM

## 2019-06-13 RX ORDER — METRONIDAZOLE 7.5 MG/G
GEL VAGINAL
Qty: 2 TUBE | Refills: 2 | Status: SHIPPED | OUTPATIENT
Start: 2019-06-13 | End: 2020-07-07 | Stop reason: ALTCHOICE

## 2019-06-13 RX ORDER — METRONIDAZOLE 500 MG/1
500 TABLET ORAL 2 TIMES DAILY
Qty: 14 TABLET | Refills: 0 | Status: SHIPPED | OUTPATIENT
Start: 2019-06-13 | End: 2019-06-20

## 2019-06-13 NOTE — TELEPHONE ENCOUNTER
45 y/o Gyn pt calling for results. Informed pt that the office doesn't have all results in but will call and see if they are completed. Called lab for results, GC and Chlamydia not completed yet but faxing over rest of results.

## 2019-06-18 DIAGNOSIS — N89.8 VAGINAL DISCHARGE: ICD-10-CM

## 2019-09-18 ENCOUNTER — OFFICE VISIT (OUTPATIENT)
Dept: OBGYN CLINIC | Age: 39
End: 2019-09-18
Payer: COMMERCIAL

## 2019-09-18 VITALS
DIASTOLIC BLOOD PRESSURE: 80 MMHG | SYSTOLIC BLOOD PRESSURE: 130 MMHG | BODY MASS INDEX: 23.56 KG/M2 | HEIGHT: 61 IN | WEIGHT: 124.8 LBS

## 2019-09-18 DIAGNOSIS — Z01.419 WOMEN'S ANNUAL ROUTINE GYNECOLOGICAL EXAMINATION: Primary | ICD-10-CM

## 2019-09-18 DIAGNOSIS — R10.2 PELVIC PAIN IN FEMALE: ICD-10-CM

## 2019-09-18 PROCEDURE — 99395 PREV VISIT EST AGE 18-39: CPT | Performed by: OBSTETRICS & GYNECOLOGY

## 2019-09-18 RX ORDER — CLONIDINE HYDROCHLORIDE 0.1 MG/1
TABLET, EXTENDED RELEASE ORAL
Refills: 1 | COMMUNITY
Start: 2019-08-29 | End: 2020-12-15 | Stop reason: ALTCHOICE

## 2019-09-18 RX ORDER — OXYTOCIN 10 [USP'U]/ML
10 INJECTION, SOLUTION INTRAMUSCULAR; INTRAVENOUS ONCE
COMMUNITY
End: 2021-09-23

## 2019-09-18 ASSESSMENT — ENCOUNTER SYMPTOMS
ABDOMINAL PAIN: 0
COUGH: 0
SHORTNESS OF BREATH: 0
ABDOMINAL DISTENTION: 0
BACK PAIN: 0
DIARRHEA: 0
CONSTIPATION: 0

## 2019-09-18 NOTE — PROGRESS NOTES
injection Inject 10 Units into the muscle once      ALPRAZolam (XANAX) 1 MG tablet TK 1 AND 1/2 TO 2 TS PO QID  0    ST JOHNS WORT PO Take by mouth \"Positive Thoughts\"      Cholecalciferol (VITAMIN D3) 64254 UNITS CAPS Take 1 capsule by mouth once a week      LORazepam (ATIVAN) 2 MG tablet Take 2 mg by mouth nightly      HYDROcodone-acetaminophen (NORCO) 7.5-325 MG per tablet   Take 1 tablet by mouth every 8 hours as needed Migraines      metroNIDAZOLE (METROGEL VAGINAL) 0.75 % vaginal gel One applicator intravaginally twice weekly x 8 weekss (Patient not taking: Reported on 9/18/2019) 2 Tube 2    metroNIDAZOLE (METROGEL VAGINAL) 0.75 % vaginal gel One applicator intravaginally twice weekly x 8 weeks (Patient not taking: Reported on 9/18/2019) 1 Tube 2    Magnesium Gluconate 550 MG TABS Take 30 mg by mouth      Taurine 1000 MG CAPS Take by mouth      NONFORMULARY RUKHSANA 750mg      NONFORMULARY Honey capsuls       No current facility-administered medications for this visit. ALLERGIES:  Latex; Cleocin [clindamycin hcl]; Amoxicillin-pot clavulanate; and Levofloxacin      Review of Systems   Constitutional: Negative for appetite change and fatigue. HENT: Negative for congestion and hearing loss. Eyes: Negative for visual disturbance. Respiratory: Negative for cough and shortness of breath. Cardiovascular: Negative for chest pain and palpitations. Gastrointestinal: Negative for abdominal distention, abdominal pain, constipation and diarrhea. Genitourinary: Negative for flank pain, frequency, menstrual problem, pelvic pain and vaginal discharge. Musculoskeletal: Negative for back pain. Neurological: Negative for syncope and headaches. Psychiatric/Behavioral: Negative for behavioral problems. Objective:   Physical Exam   Constitutional: She is oriented to person, place, and time. She appears well-developed and well-nourished. Eyes: Pupils are equal, round, and reactive to light.

## 2019-09-24 ENCOUNTER — PROCEDURE VISIT (OUTPATIENT)
Dept: OBGYN CLINIC | Age: 39
End: 2019-09-24
Payer: COMMERCIAL

## 2019-09-24 DIAGNOSIS — R10.2 PELVIC PAIN IN FEMALE: ICD-10-CM

## 2019-09-24 DIAGNOSIS — R10.2 PELVIC PAIN: Primary | ICD-10-CM

## 2019-09-24 PROCEDURE — 76856 US EXAM PELVIC COMPLETE: CPT | Performed by: OBSTETRICS & GYNECOLOGY

## 2019-09-24 PROCEDURE — 76830 TRANSVAGINAL US NON-OB: CPT | Performed by: OBSTETRICS & GYNECOLOGY

## 2019-10-09 ENCOUNTER — OFFICE VISIT (OUTPATIENT)
Dept: OBGYN CLINIC | Age: 39
End: 2019-10-09
Payer: COMMERCIAL

## 2019-10-09 VITALS
DIASTOLIC BLOOD PRESSURE: 68 MMHG | WEIGHT: 122.8 LBS | SYSTOLIC BLOOD PRESSURE: 116 MMHG | BODY MASS INDEX: 23.18 KG/M2 | HEIGHT: 61 IN

## 2019-10-09 DIAGNOSIS — Z01.419 WOMEN'S ANNUAL ROUTINE GYNECOLOGICAL EXAMINATION: Primary | ICD-10-CM

## 2019-10-09 DIAGNOSIS — Z12.39 BREAST CANCER SCREENING: ICD-10-CM

## 2019-10-09 PROCEDURE — 99213 OFFICE O/P EST LOW 20 MIN: CPT | Performed by: OBSTETRICS & GYNECOLOGY

## 2019-10-10 DIAGNOSIS — Z01.419 WOMEN'S ANNUAL ROUTINE GYNECOLOGICAL EXAMINATION: ICD-10-CM

## 2019-10-16 DIAGNOSIS — Z01.419 WOMEN'S ANNUAL ROUTINE GYNECOLOGICAL EXAMINATION: ICD-10-CM

## 2019-11-14 ENCOUNTER — PROCEDURE VISIT (OUTPATIENT)
Dept: OBGYN CLINIC | Age: 39
End: 2019-11-14
Payer: COMMERCIAL

## 2019-11-14 VITALS
DIASTOLIC BLOOD PRESSURE: 88 MMHG | SYSTOLIC BLOOD PRESSURE: 130 MMHG | WEIGHT: 123.6 LBS | HEIGHT: 61 IN | BODY MASS INDEX: 23.33 KG/M2

## 2019-11-14 DIAGNOSIS — N76.0 BV (BACTERIAL VAGINOSIS): ICD-10-CM

## 2019-11-14 DIAGNOSIS — R87.610 ATYPICAL SQUAMOUS CELLS OF UNDETERMINED SIGNIFICANCE ON CYTOLOGIC SMEAR OF CERVIX (ASC-US): Primary | ICD-10-CM

## 2019-11-14 DIAGNOSIS — B96.89 BV (BACTERIAL VAGINOSIS): ICD-10-CM

## 2019-11-14 PROCEDURE — 57456 ENDOCERV CURETTAGE W/SCOPE: CPT | Performed by: OBSTETRICS & GYNECOLOGY

## 2019-11-15 DIAGNOSIS — B96.89 BV (BACTERIAL VAGINOSIS): ICD-10-CM

## 2019-11-15 DIAGNOSIS — N76.0 BV (BACTERIAL VAGINOSIS): ICD-10-CM

## 2019-11-19 DIAGNOSIS — R87.610 ATYPICAL SQUAMOUS CELLS OF UNDETERMINED SIGNIFICANCE ON CYTOLOGIC SMEAR OF CERVIX (ASC-US): ICD-10-CM

## 2019-11-25 ENCOUNTER — OFFICE VISIT (OUTPATIENT)
Dept: OBGYN CLINIC | Age: 39
End: 2019-11-25
Payer: COMMERCIAL

## 2019-11-25 VITALS
HEIGHT: 61 IN | DIASTOLIC BLOOD PRESSURE: 70 MMHG | BODY MASS INDEX: 23.26 KG/M2 | WEIGHT: 123.2 LBS | SYSTOLIC BLOOD PRESSURE: 124 MMHG

## 2019-11-25 DIAGNOSIS — Z09 POSTOP CHECK: Primary | ICD-10-CM

## 2019-11-25 DIAGNOSIS — N76.0 ACUTE VAGINITIS: ICD-10-CM

## 2019-11-25 PROCEDURE — G8420 CALC BMI NORM PARAMETERS: HCPCS | Performed by: OBSTETRICS & GYNECOLOGY

## 2019-11-25 PROCEDURE — G8484 FLU IMMUNIZE NO ADMIN: HCPCS | Performed by: OBSTETRICS & GYNECOLOGY

## 2019-11-25 PROCEDURE — 99213 OFFICE O/P EST LOW 20 MIN: CPT | Performed by: OBSTETRICS & GYNECOLOGY

## 2019-11-25 PROCEDURE — G8427 DOCREV CUR MEDS BY ELIG CLIN: HCPCS | Performed by: OBSTETRICS & GYNECOLOGY

## 2019-11-25 PROCEDURE — 1036F TOBACCO NON-USER: CPT | Performed by: OBSTETRICS & GYNECOLOGY

## 2019-11-25 RX ORDER — FLUCONAZOLE 150 MG/1
150 TABLET ORAL ONCE
Qty: 1 TABLET | Refills: 0 | Status: SHIPPED | OUTPATIENT
Start: 2019-11-25 | End: 2019-11-25

## 2019-11-27 ENCOUNTER — TELEPHONE (OUTPATIENT)
Dept: OBGYN CLINIC | Age: 39
End: 2019-11-27

## 2019-12-11 ENCOUNTER — TELEPHONE (OUTPATIENT)
Dept: OBGYN CLINIC | Age: 39
End: 2019-12-11

## 2019-12-16 ENCOUNTER — TELEPHONE (OUTPATIENT)
Dept: OBGYN CLINIC | Age: 39
End: 2019-12-16

## 2019-12-17 ENCOUNTER — TELEPHONE (OUTPATIENT)
Dept: OBGYN CLINIC | Age: 39
End: 2019-12-17

## 2019-12-18 DIAGNOSIS — N76.0 BV (BACTERIAL VAGINOSIS): Primary | ICD-10-CM

## 2019-12-18 DIAGNOSIS — B96.89 BV (BACTERIAL VAGINOSIS): Primary | ICD-10-CM

## 2019-12-18 DIAGNOSIS — R11.2 NAUSEA AND VOMITING, INTRACTABILITY OF VOMITING NOT SPECIFIED, UNSPECIFIED VOMITING TYPE: ICD-10-CM

## 2019-12-18 RX ORDER — ONDANSETRON 4 MG/1
TABLET, FILM COATED ORAL
Refills: 0 | OUTPATIENT
Start: 2019-12-18

## 2019-12-18 RX ORDER — ONDANSETRON 4 MG/1
4 TABLET, ORALLY DISINTEGRATING ORAL EVERY 8 HOURS PRN
Qty: 6 TABLET | Refills: 2 | Status: SHIPPED | OUTPATIENT
Start: 2019-12-18

## 2019-12-18 RX ORDER — ONDANSETRON 4 MG/1
TABLET, FILM COATED ORAL DAILY PRN
Refills: 0 | Status: CANCELLED | OUTPATIENT
Start: 2019-12-18

## 2019-12-31 ENCOUNTER — TELEPHONE (OUTPATIENT)
Dept: OBGYN CLINIC | Age: 39
End: 2019-12-31

## 2019-12-31 RX ORDER — METRONIDAZOLE 7.5 MG/G
GEL VAGINAL
Qty: 1 TUBE | Refills: 0 | Status: SHIPPED | OUTPATIENT
Start: 2019-12-31 | End: 2020-01-07

## 2020-01-02 ENCOUNTER — TELEPHONE (OUTPATIENT)
Dept: OBGYN CLINIC | Age: 40
End: 2020-01-02

## 2020-01-02 RX ORDER — TINIDAZOLE 500 MG/1
2 TABLET ORAL
Qty: 8 TABLET | Refills: 0 | Status: SHIPPED | OUTPATIENT
Start: 2020-01-02 | End: 2020-01-04

## 2020-01-02 NOTE — TELEPHONE ENCOUNTER
43 y/o Pt states she has been having chronic bv and wanting to know if there is a different form of treatment. Pt states she has not been sexually active since before Thanksgiving and has sex toys that she washes every time she uses them. Pt has concerns that if she continues to use Metrogel that it will not be resolved. Pt states she has never used Tinidamx. Pt states she is using [employer] baby wipes when she has BM or wants to feel fresh. Pt states she uses them 3 times per day. Dicussed us if maybe switching to products made by [employer]. These products have been specially made to assist with PH balance.

## 2020-01-02 NOTE — TELEPHONE ENCOUNTER
Notified pt of  recommendations. Pt agreeable to stop use of baby wipes and willing to try Tindamax. Pt will call back if symptoms persist.     Tindamax 2mg orally 2 days Rx sent to pharmacy.

## 2020-01-09 ENCOUNTER — OFFICE VISIT (OUTPATIENT)
Dept: OBGYN CLINIC | Age: 40
End: 2020-01-09
Payer: COMMERCIAL

## 2020-01-09 VITALS
BODY MASS INDEX: 24.17 KG/M2 | WEIGHT: 128 LBS | SYSTOLIC BLOOD PRESSURE: 126 MMHG | DIASTOLIC BLOOD PRESSURE: 68 MMHG | HEIGHT: 61 IN

## 2020-01-09 PROCEDURE — 1036F TOBACCO NON-USER: CPT | Performed by: OBSTETRICS & GYNECOLOGY

## 2020-01-09 PROCEDURE — 99213 OFFICE O/P EST LOW 20 MIN: CPT | Performed by: OBSTETRICS & GYNECOLOGY

## 2020-01-09 PROCEDURE — G8428 CUR MEDS NOT DOCUMENT: HCPCS | Performed by: OBSTETRICS & GYNECOLOGY

## 2020-01-09 PROCEDURE — G8420 CALC BMI NORM PARAMETERS: HCPCS | Performed by: OBSTETRICS & GYNECOLOGY

## 2020-01-09 PROCEDURE — G8484 FLU IMMUNIZE NO ADMIN: HCPCS | Performed by: OBSTETRICS & GYNECOLOGY

## 2020-02-05 ENCOUNTER — OFFICE VISIT (OUTPATIENT)
Dept: OBGYN CLINIC | Age: 40
End: 2020-02-05
Payer: COMMERCIAL

## 2020-02-05 VITALS
DIASTOLIC BLOOD PRESSURE: 70 MMHG | WEIGHT: 125 LBS | HEIGHT: 61 IN | BODY MASS INDEX: 23.6 KG/M2 | SYSTOLIC BLOOD PRESSURE: 116 MMHG

## 2020-02-05 PROCEDURE — 99213 OFFICE O/P EST LOW 20 MIN: CPT | Performed by: OBSTETRICS & GYNECOLOGY

## 2020-02-05 PROCEDURE — G8420 CALC BMI NORM PARAMETERS: HCPCS | Performed by: OBSTETRICS & GYNECOLOGY

## 2020-02-05 PROCEDURE — G8484 FLU IMMUNIZE NO ADMIN: HCPCS | Performed by: OBSTETRICS & GYNECOLOGY

## 2020-02-05 PROCEDURE — 1036F TOBACCO NON-USER: CPT | Performed by: OBSTETRICS & GYNECOLOGY

## 2020-02-05 PROCEDURE — G8428 CUR MEDS NOT DOCUMENT: HCPCS | Performed by: OBSTETRICS & GYNECOLOGY

## 2020-02-07 ENCOUNTER — TELEPHONE (OUTPATIENT)
Dept: OBGYN CLINIC | Age: 40
End: 2020-02-07

## 2020-02-07 RX ORDER — METRONIDAZOLE 500 MG/1
500 TABLET ORAL 2 TIMES DAILY
Qty: 14 TABLET | Refills: 0 | Status: SHIPPED | OUTPATIENT
Start: 2020-02-07 | End: 2020-02-14

## 2020-03-31 ENCOUNTER — HOSPITAL ENCOUNTER (EMERGENCY)
Facility: CLINIC | Age: 40
Discharge: HOME | End: 2020-03-31
Attending: EMERGENCY MEDICINE
Payer: COMMERCIAL

## 2020-03-31 VITALS
DIASTOLIC BLOOD PRESSURE: 109 MMHG | HEART RATE: 112 BPM | TEMPERATURE: 98.5 F | HEIGHT: 63 IN | SYSTOLIC BLOOD PRESSURE: 133 MMHG | OXYGEN SATURATION: 99 % | RESPIRATION RATE: 20 BRPM | WEIGHT: 115 LBS | BODY MASS INDEX: 20.38 KG/M2

## 2020-03-31 PROCEDURE — 99282 EMERGENCY DEPT VISIT SF MDM: CPT

## 2020-03-31 RX ORDER — ROPINIROLE 1 MG/1
1 TABLET, FILM COATED ORAL 2 TIMES DAILY
COMMUNITY
End: 2020-03-31

## 2020-03-31 RX ORDER — IBUPROFEN 600 MG/1
600 TABLET ORAL EVERY 6 HOURS PRN
COMMUNITY

## 2020-03-31 RX ORDER — QUETIAPINE FUMARATE 100 MG/1
100 TABLET, FILM COATED ORAL DAILY
COMMUNITY
End: 2020-03-31

## 2020-03-31 RX ORDER — HYDROCORTISONE 5 MG/1
10 TABLET ORAL DAILY
COMMUNITY
End: 2020-07-07 | Stop reason: ALTCHOICE

## 2020-03-31 RX ORDER — HYDROXYZINE HYDROCHLORIDE 25 MG/1
25 TABLET, FILM COATED ORAL EVERY 6 HOURS PRN
COMMUNITY
End: 2020-07-07 | Stop reason: ALTCHOICE

## 2020-03-31 NOTE — ED PROVIDER NOTES
[facility]  eMERGENCY dEPARTMENT eNCOUnter      Pt Name: Radha Youngblood  MRN: [id number]  [place] 1980  Date of evaluation: 3/31/2020      CHIEF COMPLAINT       Chief Complaint   Patient presents with    Rash     she reports that she noticed discoloration to lower extremities with more prominent veins noted, she relates this to starting new medications when she noticed it last Tuesday, denies itching, pain or discomfort         HISTORY OF PRESENT ILLNESS      The pt presents reporting skin changes. She started on new medicatios at the [facility] Detox facility for opioid abuse. She is currently taking Requip, Seroquel, hydrocortisone, and Atarax. She was told that she needed to take these medicines but didn't know with whom to follow up. She has not called the facility about this. She has noticed some vasodilated/mottled looking skin on her thighs and thought this might be a rash. She has not having any itching or swelling. She denies difficulty breathing or swallowing. She does feel a little anxious. The patient says that if she takes stops taking the Seroquel, she will not be able to go to sleep at night. She has only been on the medicines for a week. REVIEW OF SYSTEMS       All systems reviewed and negative unless noted in HPI. The patient denies fever or constitutional symptoms. Denies vision change. Denies any sore throat or rhinorrhea. Denies any neck pain or stiffness. Denies chest pain or shortness of breath. No nausea,  vomiting or diarrhea. Denies any dysuria. Denies urinary frequency or hematuria. Denies musculoskeletal injury or pain. Denies any weakness, numbness or focal neurologic deficit. Lines on thighs noted. Mark Ace History of substance abuse; feels anxious. No easy bruising or bleeding. Denies any polyuria, polydypsia or history of immunocompromise.       PAST MEDICAL HISTORY    has a past medical history of Arthritis, with no pain to palpation. No pulsatile mass. Normal bowel sounds are noted. No rebound or guarding. Musculoskeletal exam shows no evidence of trauma. Normal distal pulses in all extremities. Skin: no rash or edema. Linear-appearing superficial vessels noted, without true mottling of skin. Warm and dry skin, without edema on thighs and knees. No other rash noted. Neurological exam reveals cranial nerves 2 through 12 grossly intact. Patient has equal  and normal deep tendon reflexes. Psychiatric: no hallucinations or suicidal ideation. Lymphatics.:  No lymphadenopathy. DIFFERENTIAL DIAGNOSIS/ MDM:     Vasodilation, rash, allergic reaction    DIAGNOSTIC RESULTS       EMERGENCY DEPARTMENT COURSE:   Vitals:    Vitals:    03/31/20 1636   BP: (!) 133/109   Pulse: 112   Resp: 20   Temp: 98.5 °F (36.9 °C)   TempSrc: Oral   SpO2: 99%   Weight: 52.2 kg (115 lb)   Height: 5' 3\" (1.6 m)     -------------------------  BP: (!) 133/109, Temp: 98.5 °F (36.9 °C), Pulse: 112, Resp: 20      Re-evaluation Notes    I explained to the patient, that it would be safest to stop taking the Requip and the Seroquel. The Atarax and hydrocortisone would be used for an allergic reaction. She also has been taking ibuprofen. She says she doesn't really need it because she is not having pain. She may wish to stop this as well, as it can cause some skin changes, in some patients. The patient is reminded to follow-up with the addiction specialist, or with her PCP. She is discharged in good condition. CONSULTS:    [address]. 1708  Discussed with nurse at detox facility. Can stop Requip and Seroquel. She chose to go home without scheduling a follow-up plan. FINAL IMPRESSION      1.  Dermatitis          DISPOSITION/PLAN   DISPOSITION        Condition on Disposition    good    PATIENT REFERRED TO:  [facility]    In 1 day      Bri Morley  [address]  [facility] [address]  [phone]    In 1 day        DISCHARGE MEDICATIONS:  Current Discharge Medication List          (Please note that portions of this note were completed with a voice recognition program.  Efforts were made to edit the dictations but occasionally words are mis-transcribed.)    David MD   Attending Emergency Physician       Chan Gomez MD  03/31/20 [phone]

## 2020-04-06 ENCOUNTER — TELEPHONE (OUTPATIENT)
Dept: FAMILY MEDICINE CLINIC | Age: 40
End: 2020-04-06

## 2020-04-06 NOTE — TELEPHONE ENCOUNTER
This patient was given Miguel Angel Rank name and number from [facility]. She is just released from [address] [place] [address] (03/29/2020) due to Opoid addiction. She is freaking out with extreme anxiety. She states she is not eating anything, nothing. She is having  nausea and vomiting. She states ER called [facility] but they had no suggestion. She states she can not even be a mom right now. I had her download the My Chart charli if you want a virtual visit. She is crying and keeps stating her anxiety is so severe right now. She needs help  She stated she is currently taking:  Reqip 1 mg BID  Seroquel 100 mg HS  Atarax 25 mg QID (stopped and given propranolol due to elevated BP)  Tylenol OTC      Her regular PCP Dr Fidencio Figueroa gave her propranolol. This caused her a rash (see went to ER).     PLEASE ADVISE

## 2020-04-16 ENCOUNTER — TELEMEDICINE (OUTPATIENT)
Dept: OBGYN CLINIC | Age: 40
End: 2020-04-16
Payer: COMMERCIAL

## 2020-04-16 VITALS — HEIGHT: 63 IN | BODY MASS INDEX: 20.37 KG/M2

## 2020-04-16 PROCEDURE — 99213 OFFICE O/P EST LOW 20 MIN: CPT | Performed by: OBSTETRICS & GYNECOLOGY

## 2020-04-16 PROCEDURE — G8427 DOCREV CUR MEDS BY ELIG CLIN: HCPCS | Performed by: OBSTETRICS & GYNECOLOGY

## 2020-04-16 RX ORDER — FLUCONAZOLE 150 MG/1
150 TABLET ORAL
Qty: 2 TABLET | Refills: 2 | Status: SHIPPED | OUTPATIENT
Start: 2020-04-16 | End: 2020-07-07 | Stop reason: ALTCHOICE

## 2020-04-16 NOTE — PROGRESS NOTES
tacycardia resting heart rate now      SECTION, LOW TRANSVERSE  9/15/12    COLPOSCOPY  10/19/2018    1oclock & 6oclock LSIL    COLPOSCOPY  2019    CYSTOSCOPY  2018    FOOT SURGERY      for osteoarthritis metal plates in both feet    LAPAROSCOPY      ? endometriosis for constant vaginal bleeding    LAPAROSCOPY  2018    crystal partial salpingectomy, NovaSure ablation, hysteroscopy       PHYSICAL EXAMINATION:  [ INSTRUCTIONS:  \"[x]\" Indicates a positive item  \"[]\" Indicates a negative item  -- DELETE ALL ITEMS NOT EXAMINED]  Vital Signs: (As obtained by patient/caregiver or practitioner observation)    Constitutional: [x] Appears well-developed and well-nourished [x] No apparent distress      [] Abnormal-   Mental status  [x] Alert and awake  [x] Oriented to person/place/time [x]Able to follow commands      Skin:        [x] No significant exanthematous lesions or discoloration noted on facial skin         [] Abnormal-            Psychiatric:       [x] Normal Affect  Other pertinent observable physical exam findings-     ASSESSMENT/PLAN:  Encounter Diagnosis   Name Primary?  Vaginal discharge Yes   I suspect yeast, and will call in for Diflucan. She will call if this does not dure the problem    Return in about 6 weeks (around 2020). Don Seaman is a 44 y.o. female being evaluated by a Virtual Visit (video visit) encounter to address concerns as mentioned above. A caregiver was present when appropriate. Due to this being a TeleHealth encounter (During [facility]- public health emergency), evaluation of the following organ systems was limited: Vitals/Constitutional/EENT/Resp/CV/GI//MS/Neuro/Skin/Heme-Lymph-Imm.   Pursuant to the emergency declaration under the [facility] [address] waiver authority and the [employer] Act, this Virtual Visit was conducted with patient's (and/or legal

## 2020-06-11 ENCOUNTER — HOSPITAL ENCOUNTER (OUTPATIENT)
Age: 40
Setting detail: SPECIMEN
Discharge: HOME | End: 2020-06-11
Payer: COMMERCIAL

## 2020-06-11 ENCOUNTER — OFFICE VISIT (OUTPATIENT)
Dept: OBGYN CLINIC | Age: 40
End: 2020-06-11
Payer: MEDICARE

## 2020-06-11 VITALS
DIASTOLIC BLOOD PRESSURE: 78 MMHG | SYSTOLIC BLOOD PRESSURE: 108 MMHG | BODY MASS INDEX: 21.62 KG/M2 | WEIGHT: 122 LBS | HEIGHT: 63 IN

## 2020-06-11 LAB
DIRECT EXAM: NORMAL
Lab: NORMAL
SPECIMEN DESCRIPTION: NORMAL

## 2020-06-11 PROCEDURE — G8427 DOCREV CUR MEDS BY ELIG CLIN: HCPCS | Performed by: OBSTETRICS & GYNECOLOGY

## 2020-06-11 PROCEDURE — 99213 OFFICE O/P EST LOW 20 MIN: CPT | Performed by: OBSTETRICS & GYNECOLOGY

## 2020-06-11 PROCEDURE — G8420 CALC BMI NORM PARAMETERS: HCPCS | Performed by: OBSTETRICS & GYNECOLOGY

## 2020-06-11 PROCEDURE — 4004F PT TOBACCO SCREEN RCVD TLK: CPT | Performed by: OBSTETRICS & GYNECOLOGY

## 2020-06-11 RX ORDER — SECUKINUMAB 150 MG/ML
INJECTION SUBCUTANEOUS
COMMUNITY
Start: 2020-03-18 | End: 2020-12-15 | Stop reason: ALTCHOICE

## 2020-06-11 RX ORDER — FLUCONAZOLE 150 MG/1
150 TABLET ORAL
Qty: 2 TABLET | Refills: 2 | Status: SHIPPED | OUTPATIENT
Start: 2020-06-11 | End: 2020-07-07 | Stop reason: ALTCHOICE

## 2020-06-11 NOTE — PROGRESS NOTES
Patient is a 36 y.o. [id number]  seen with total face to face time of 15 minutes. More than 50% of this visit was on counseling and education regarding her    Diagnosis Orders   1. Atypical squamous cells of undetermined significance on cytologic smear of cervix (ASC-US)  PAP SMEAR   2. Pap smear abnormality of cervix/human papillomavirus (HPV) positive  PAP SMEAR   3. Vaginal itching  VAGINITIS DNA PROBE    and her options. She was also counseled on her preventative health maintenance recommendations and follow-up. Blood pressure 108/78, height 5' 3\" (1.6 m), weight 122 lb (55.3 kg), not currently breastfeeding. No results found for this or any previous visit (from the past 8736 hour(s)). The patient is here to have her Pap smear repeated after an ASCUS but benign path on colposcopy. She also reports a vaginal discharge with itching but no odor  On exam, it is fairly obviously yeast.  I have sent off an affirm but I will treat meanwhile    IMP:   Encounter Diagnoses   Name Primary?     Atypical squamous cells of undetermined significance on cytologic smear of cervix (ASC-US) Yes    Pap smear abnormality of cervix/human papillomavirus (HPV) positive     Vaginal itching          PLAN: RV 6 months for annual  Diflucan pending Affirm results

## 2020-06-18 LAB — CYTOLOGY REPORT: NORMAL

## 2020-06-18 RX ORDER — FLUCONAZOLE 150 MG/1
150 TABLET ORAL ONCE
Qty: 1 TABLET | Refills: 0 | Status: SHIPPED | OUTPATIENT
Start: 2020-06-18 | End: 2020-06-18

## 2020-06-22 LAB
HPV SAMPLE: ABNORMAL
HPV, GENOTYPE 16: NOT DETECTED
HPV, GENOTYPE 18: NOT DETECTED
HPV, HIGH RISK OTHER: DETECTED
HPV, INTERPRETATION: ABNORMAL
SPECIMEN DESCRIPTION: ABNORMAL

## 2020-07-07 ENCOUNTER — OFFICE VISIT (OUTPATIENT)
Dept: OBGYN CLINIC | Age: 40
End: 2020-07-07
Payer: COMMERCIAL

## 2020-07-07 VITALS
WEIGHT: 121.6 LBS | DIASTOLIC BLOOD PRESSURE: 74 MMHG | HEIGHT: 63 IN | BODY MASS INDEX: 21.55 KG/M2 | SYSTOLIC BLOOD PRESSURE: 110 MMHG

## 2020-07-07 PROCEDURE — G8420 CALC BMI NORM PARAMETERS: HCPCS | Performed by: NURSE PRACTITIONER

## 2020-07-07 PROCEDURE — 4004F PT TOBACCO SCREEN RCVD TLK: CPT | Performed by: NURSE PRACTITIONER

## 2020-07-07 PROCEDURE — G8427 DOCREV CUR MEDS BY ELIG CLIN: HCPCS | Performed by: NURSE PRACTITIONER

## 2020-07-07 PROCEDURE — 99213 OFFICE O/P EST LOW 20 MIN: CPT | Performed by: NURSE PRACTITIONER

## 2020-07-07 NOTE — PROGRESS NOTES
S-  Here for c/o a lesion noted on her labia a few days ago. No drainage, pain or itching and the lesion has not bled. Pt has been using a steroid cream on her lbaia per her dermatologist. I discussed with her that I think it should be bx. She has an appt with her dermatologist this Thursday and I advised her to have it looked at and biopsied there- she is agreeable to [address] [place] [address]. O-  Physical Exam  Genitourinary:     Labia:         Right: Lesion present. No rash, tenderness or injury. Left: No rash, tenderness, lesion or injury. Vagina: Normal.           A-  Patient is a 36 y.o. [id number]  seen with total face to face time of 15 minutes. More than 50% of this visit was on counseling and education regarding her    Diagnosis Orders   1. Genital lesion, female      and her options. She was also counseled on her preventative health maintenance recommendations and follow-up of annual exam. Refer to plan and assessment. Recent Results (from the past 8736 hour(s))   GYN Cytology    Collection Time: 06/11/20 12:25 PM   Result Value Ref Range    Cytology Report       INTERPRETATION    Cervical material, (ThinPrep vial, Imaging-assisted review):  Specimen Adequacy:       Satisfactory for evaluation.       -Endocervical/transformation zone component is absent. Descriptive Diagnosis:       Low-grade squamous intraepithelial lesion (LSIL). Fungal organisms morphologically consistent with Candida species. Comments:       High Risk HPV testing was ordered.       Cytotechnologist:   Lexis Hassan M.D.  **Electronically Signed Out**         ajb/6/18/2020          Procedure/Addendum  HPV Procedure Report     Date Ordered:     6/18/2020     Status:  Signed Out       Date Complete:     6/18/2020     By: BRYON Pringle(ASCP)       Date Reported:     6/22/2020       INTERPRETATION  Roche HPV DNA High Risk                                                        HPV Sample               Thin Prep (Ref Range)  HPV Type 16               Not Detected                    (Not  Detected)  HPV Type 18               Not Detected                     (Not  Detected)  Other High Risk HPV     Detected                    (Not Detected)       This test amplifies and detects DNA of 14 high-risk HPV types  associated with cervical cancer and its precursor lesions (HPV types  16, 18, 31, 33, 35, 39, 45, 51, 52, 56, 58, 59, 66, and 68). Sensitivity may be affected by specimen collection methods, stage of  infection, and the presence of interfering substances. Results should  be interpreted in conjunction with other available laboratory and  clinical data. A negative high-risk HPV result does not exclude the  possibility of future cytologic HSIL or underlying CIN2-3 or cancer. This test is intended for medical purposes only and is not valid for  the evaluation of suspected sexual abuse or for other forensic  purposes. Source:  1: Cervical material, (ThinPrep vial, Imaging-assisted review)    Clinical History  R87.610 Cytology smear of cervix with ASC-US  R87.810 Cervical HR HPV DNA test positive  High risk HPV DNA  testing is requested if the diagnosis is abnormal    GYNECOLOGIC CYTOLOGY REPORT    Patient Name: Cornelius Blank [facility] Rec: [id number]  Path Number: [id number]  [address] [place] [address]. [place], [address]  [phone]  Fax: [phone]     Human papillomavirus (HPV) DNA probe thin prep high risk    Collection Time: 06/11/20  8:00 PM   Result Value Ref Range    Specimen Description . CERVIX     HPV Sample . THIN PREP     HPV, Genotype 16 Not Detected Not Detected    HPV, Genotype 18 Not Detected Not Detected    HPV, High Risk Other DETECTED (A) Not Detected    HPV, Interpretation         VAGINITIS DNA PROBE    Collection Time: 06/11/20  9:32 PM   Result Value Ref Range    Specimen Description . VAGINA     Special Requests NOT REPORTED     Direct Exam NEGATIVE for Candida sp. Direct Exam NEGATIVE for Gardnerella vaginalis     Direct Exam NEGATIVE for Trichomonas vaginalis     Direct Exam       Method of testing is a DNA probe intended for detection and identification of Candida species, Gardnerella vaginalis, and Trichomonas vaginalis nucleic acid in vaginal fluid specimens from patients with symptoms of vaginitis/vaginosis.        P-  FU derm- r/o neoplasm  RTO annual exam and prn

## 2020-09-28 ENCOUNTER — HOSPITAL ENCOUNTER (OUTPATIENT)
Age: 40
Setting detail: SPECIMEN
Discharge: HOME | End: 2020-09-28
Payer: COMMERCIAL

## 2020-09-28 ENCOUNTER — OFFICE VISIT (OUTPATIENT)
Dept: OBGYN CLINIC | Age: 40
End: 2020-09-28
Payer: COMMERCIAL

## 2020-09-28 VITALS
BODY MASS INDEX: 22.39 KG/M2 | TEMPERATURE: 97.9 F | DIASTOLIC BLOOD PRESSURE: 74 MMHG | HEIGHT: 63 IN | SYSTOLIC BLOOD PRESSURE: 130 MMHG | WEIGHT: 126.4 LBS

## 2020-09-28 PROBLEM — R87.622 VAGINAL PAP SMEAR WITH LGSIL: Status: ACTIVE | Noted: 2020-09-28

## 2020-09-28 PROCEDURE — 99213 OFFICE O/P EST LOW 20 MIN: CPT | Performed by: STUDENT IN AN ORGANIZED HEALTH CARE EDUCATION/TRAINING PROGRAM

## 2020-09-28 NOTE — PROGRESS NOTES
[facility]   [facility] [address], [place] [address]    Problem Visit      Preeti Singer  2020                       Primary Care Physician: Lois Yu    CC:   Chief Complaint   Patient presents with    Vaginal Itching     vaginal discharge, sore, minor vaginal odor, hpv warts         HPI: Preeti Singer is a 36 y.o. female  No LMP recorded. Patient has had an ablation. The patient was seen and examined. She is here for vaginal discharge and is complaining of vaginal irritation. The patient has a history of bacterial vaginosis and yeast infections. She says she uses very gentle body wash. Encourage patient to use unscented laundry detergent as well. Patient also has a history of genital warts that have been removed multiple times by her dermatologist.  She is concerned that they may be in her vagina as well. Upon chart review, patient's most recent Pap smear showed LSIL with positive other high risk HPV. She does qualify for a colposcopy per ASCCP guidelines.     REVIEW OF SYSTEMS:  Constitutional: negative fever, negative chills  HEENT: negative visual disturbances, negative headaches  Respiratory: negative dyspnea, negative cough  Cardiovascular: negative chest pain,  negative palpitations  Gastrointestinal: negative abdominal pain, negative RUQ pain, negative N/V, negative diarrhea, negative constipation  Genitourinary: negative dysuria, positive vaginal discharge  Dermatological: negative rash  Hematologic: negative bruising  Immunologic/Lymphatic: negative recent illness, negative recent sick contact  Musculoskeletal: negative back pain, negative myalgias, negative arthralgias  Neurological:  negative dizziness, negative weakness  Behavior/Psych: negative depression, negative anxiety    OBSTETRICAL HISTORY:  OB History    Para Term  AB Living   2 2 2 0 0 2   SAB TAB Ectopic Molar Multiple Live Births   0 0 0   0 2      # Outcome Date GA Lbr Pieter/2nd Weight Sex Delivery Anes PTL Lv   2 Term 14 37w0d  3 lb 2 oz (1.417 kg) M CS-LTranv   SURJIT   1 Term 09/15/12 41w4d  7 lb 1 oz (3.204 kg) M CS-LTranv   SURJIT      Birth Comments: circ 12 Dr Gabby Woodard HISTORY:      Diagnosis Date    Arthritis     Atypical squamous cells of undetermined significance (ASCUS) on Papanicolaou smear of cervix 2018    HPV+    History of vaginal bleeding     constant bled for 5 yrs cause unknown    Migraine headache 7/10/2012    Osteoarthritis     with rheamuatoid arthritis    Ovarian cyst     Psoriasis     Renal lithiasis     Hx of kidney stones    Tachycardia     acute tachycardia. Had 2 ablations    Thyroid disease     treated as teen.   No problem since then    Varicosities        PAST SURGICAL HISTORY:                                                                    Procedure Laterality Date    APPENDECTOMY      CARDIAC SURGERY      x 2 for tacycardia resting heart rate now      SECTION, LOW TRANSVERSE  9/15/12    COLPOSCOPY  10/19/2018    1oclock & 6oclock LSIL    COLPOSCOPY  2019    CYSTOSCOPY  2018    FOOT SURGERY      for osteoarthritis metal plates in both feet    LAPAROSCOPY      ? endometriosis for constant vaginal bleeding    LAPAROSCOPY  2018    crystal partial salpingectomy, NovaSure ablation, hysteroscopy       MEDICATIONS:  Current Outpatient Medications   Medication Sig Dispense Refill    Tamsulosin HCl (FLOMAX PO) Take by mouth      COSENTYX SENSOREADY, 300 MG, 150 MG/ML SOAJ       ibuprofen (ADVIL;MOTRIN) 600 MG tablet Take 600 mg by mouth every 6 hours as needed for Pain      ondansetron (ZOFRAN-ODT) 4 MG disintegrating tablet Take 1 tablet by mouth every 8 hours as needed for Nausea or Vomiting 6 tablet 2    cloNIDine (KAPVAY) 0.1 MG TB12 extended release tablet TK 1 T PO BID  1    oxytocin (PITOCIN) 10 UNIT/ML injection 10 Units by Other route once Indications: dissolved under on counseling and education regarding her diagnose(s) as listed below and options. She was also counseled on her preventative health maintenance recommendations and follow-up. Diagnosis Orders   1. Vaginal discharge  VAGINITIS DNA PROBE   2.  Vaginal Pap smear with LGSIL           Ruel Roberts DO  [address] , [address]  9/28/2020, 3:58 PM

## 2020-09-29 LAB
DIRECT EXAM: NORMAL
Lab: NORMAL
SPECIMEN DESCRIPTION: NORMAL

## 2020-11-03 ENCOUNTER — PROCEDURE VISIT (OUTPATIENT)
Dept: OBGYN CLINIC | Age: 40
End: 2020-11-03
Payer: COMMERCIAL

## 2020-11-03 ENCOUNTER — HOSPITAL ENCOUNTER (OUTPATIENT)
Age: 40
Setting detail: SPECIMEN
Discharge: HOME | End: 2020-11-03
Payer: COMMERCIAL

## 2020-11-03 VITALS
WEIGHT: 126 LBS | HEIGHT: 63 IN | BODY MASS INDEX: 22.32 KG/M2 | SYSTOLIC BLOOD PRESSURE: 110 MMHG | TEMPERATURE: 99 F | DIASTOLIC BLOOD PRESSURE: 72 MMHG

## 2020-11-03 PROCEDURE — 57454 BX/CURETT OF CERVIX W/SCOPE: CPT | Performed by: STUDENT IN AN ORGANIZED HEALTH CARE EDUCATION/TRAINING PROGRAM

## 2020-11-03 NOTE — PROGRESS NOTES
patient. All counts and instruments were correct at the end of the procedure. Lab:  Pregnancy Test:  Lab Results   Component Value Date    PREGTESTUR positive 04/07/2014    HCGQUANT 4,842 (H) 03/27/2014       Assessment & Plan:  Elias Huber 36 y.o. female [id number]  Patient Active Problem List    Diagnosis Date Noted    Psoriasis      Priority: High    Migraine headache 07/10/2012     Priority: Low     Takes Vicodin PRN      Osteoarthritis      Priority: Low    Vaginal Pap smear with LGSIL 09/28/2020    Elevated blood pressure reading 11/12/2014     Labetalol 200mg BID  Updating deleted diagnoses         The patient was counseled on follow up and home care with restrictions noted.    Return in about 6 months (around 5/3/2021) for Annual.;     Amos Farmer DO  Ob/Gyn   [place]  11/3/2020, 3:49 PM

## 2020-11-05 LAB — SURGICAL PATHOLOGY REPORT: NORMAL

## 2021-03-29 ENCOUNTER — HOSPITAL ENCOUNTER (OUTPATIENT)
Age: 41
Setting detail: SPECIMEN
Discharge: HOME | End: 2021-03-29
Payer: COMMERCIAL

## 2021-03-29 ENCOUNTER — OFFICE VISIT (OUTPATIENT)
Dept: OBGYN CLINIC | Age: 41
End: 2021-03-29
Payer: COMMERCIAL

## 2021-03-29 VITALS
HEIGHT: 63 IN | BODY MASS INDEX: 24.52 KG/M2 | SYSTOLIC BLOOD PRESSURE: 110 MMHG | WEIGHT: 138.4 LBS | DIASTOLIC BLOOD PRESSURE: 70 MMHG | TEMPERATURE: 99.5 F

## 2021-03-29 DIAGNOSIS — N76.0 ACUTE VAGINITIS: Primary | ICD-10-CM

## 2021-03-29 DIAGNOSIS — N76.0 ACUTE VAGINITIS: ICD-10-CM

## 2021-03-29 DIAGNOSIS — R82.90 BAD ODOR OF URINE: ICD-10-CM

## 2021-03-29 LAB
BILIRUBIN URINE: NEGATIVE
COLOR: YELLOW
COMMENT UA: NORMAL
DIRECT EXAM: NORMAL
GLUCOSE URINE: NEGATIVE
KETONES, URINE: NEGATIVE
LEUKOCYTE ESTERASE, URINE: NEGATIVE
Lab: NORMAL
NITRITE, URINE: NEGATIVE
PH UA: 7.5 (ref 5–8)
PROTEIN UA: NEGATIVE
SPECIFIC GRAVITY UA: 1.01 (ref 1–1.03)
SPECIMEN DESCRIPTION: NORMAL
TURBIDITY: CLEAR
URINE HGB: NEGATIVE
UROBILINOGEN, URINE: NORMAL

## 2021-03-29 PROCEDURE — 99213 OFFICE O/P EST LOW 20 MIN: CPT | Performed by: NURSE PRACTITIONER

## 2021-03-29 PROCEDURE — 81003 URINALYSIS AUTO W/O SCOPE: CPT | Performed by: NURSE PRACTITIONER

## 2021-03-29 PROCEDURE — G8420 CALC BMI NORM PARAMETERS: HCPCS | Performed by: NURSE PRACTITIONER

## 2021-03-29 PROCEDURE — 4004F PT TOBACCO SCREEN RCVD TLK: CPT | Performed by: NURSE PRACTITIONER

## 2021-03-29 PROCEDURE — G8427 DOCREV CUR MEDS BY ELIG CLIN: HCPCS | Performed by: NURSE PRACTITIONER

## 2021-03-29 PROCEDURE — G8484 FLU IMMUNIZE NO ADMIN: HCPCS | Performed by: NURSE PRACTITIONER

## 2021-03-29 NOTE — PROGRESS NOTES
Vaginitis: Patient complains of an normal vaginal discharge   Vaginal symptoms include local irritation and vulvar itching. Vulvar symptoms include local irritation, vulvar itching and burning. STI Risk: Very low risk of STD exposure   Discharge described as: normal and physiologic . Menstrual pattern: She had been bleeding spots occasionally. Contraception: RRBS     /70 (Site: Right Upper Arm, Position: Sitting, Cuff Size: Medium Adult)   Temp 99.5 °F (37.5 °C)   Ht 5' 3\" (1.6 m)   Wt 138 lb 6.4 oz (62.8 kg)   Breastfeeding No   BMI 24.52 kg/m²     ALLERGIES:  See allergy list  O-  Physical Exam  Genitourinary:     General: Normal vulva. Labia:         Right: No rash, tenderness, lesion or injury. Left: No rash, tenderness, lesion or injury. Vagina: Normal. No foreign body. No vaginal discharge, erythema, tenderness, bleeding or lesions. A.Vaginitis  options. P. Affirm collected and sent to lab  Will treat results accordingly  She was also counseled on her preventative health maintenance recommendations and follow-up.   RTO annual exam and PRN

## 2021-04-05 ENCOUNTER — OFFICE VISIT (OUTPATIENT)
Dept: OBGYN CLINIC | Age: 41
End: 2021-04-05
Payer: COMMERCIAL

## 2021-04-05 VITALS
DIASTOLIC BLOOD PRESSURE: 80 MMHG | WEIGHT: 136.6 LBS | TEMPERATURE: 98.3 F | SYSTOLIC BLOOD PRESSURE: 112 MMHG | HEIGHT: 63 IN | BODY MASS INDEX: 24.2 KG/M2

## 2021-04-05 DIAGNOSIS — F52.31 ANORGASMIA OF FEMALE: ICD-10-CM

## 2021-04-05 DIAGNOSIS — R68.82 LOW LIBIDO: Primary | ICD-10-CM

## 2021-04-05 DIAGNOSIS — N89.8 VAGINAL DRYNESS: ICD-10-CM

## 2021-04-05 PROCEDURE — 4004F PT TOBACCO SCREEN RCVD TLK: CPT | Performed by: NURSE PRACTITIONER

## 2021-04-05 PROCEDURE — 99213 OFFICE O/P EST LOW 20 MIN: CPT | Performed by: NURSE PRACTITIONER

## 2021-04-05 PROCEDURE — G8420 CALC BMI NORM PARAMETERS: HCPCS | Performed by: NURSE PRACTITIONER

## 2021-04-05 PROCEDURE — G8427 DOCREV CUR MEDS BY ELIG CLIN: HCPCS | Performed by: NURSE PRACTITIONER

## 2021-04-05 ASSESSMENT — ENCOUNTER SYMPTOMS
CONSTIPATION: 0
COUGH: 0
DIARRHEA: 0
BACK PAIN: 0
ABDOMINAL PAIN: 0
SHORTNESS OF BREATH: 0
ABDOMINAL DISTENTION: 0

## 2021-04-05 NOTE — PROGRESS NOTES
Subjective:      Patient ID: Piter Lao is being seen today for   Chief Complaint   Patient presents with    Follow-up     low - vaginal dryness - trying coconut oil helps some        HPI  Here to discuss low libido. X 7 months. Has been sober x 7 months- on vivitrol. Thinking that may be a contributing factor. In therapy with her . They had been  for some time. She had previously enjoyed a very active and satisfying sex life. She states that her lack of sex drive is causing some strain on their marriage and also for her personally. She states that she cannot orgasm with any kind of activity, not even with masturbation. Advised lubricants- either coconut oil or silicon-based. Discussed the complex nature of arousal for women as well as the fact that she has had some recent life changes- and meds- that are likely complicating the issue. reviewed communicating with her  about her needs- both physically and mentally (he does not know about the vivitrol) and scheduling a date night where they can both work on daily tasks together so she may be able to concentrate more on the sexual experience. . Discussed off-label use of wellbutrin and she will discuss with her PCP. Would like to try sildenafil cream to start. Will consider options and FU 2 months. Review of Systems   Constitutional: Negative for appetite change and fatigue. HENT: Negative for congestion and hearing loss. Eyes: Negative for visual disturbance. Respiratory: Negative for cough and shortness of breath. Cardiovascular: Negative for chest pain and palpitations. Gastrointestinal: Negative for abdominal distention, abdominal pain, constipation and diarrhea. Genitourinary: Negative for flank pain, frequency, menstrual problem, pelvic pain and vaginal discharge. Musculoskeletal: Negative for back pain. Neurological: Negative for syncope and headaches. Psychiatric/Behavioral: Negative for behavioral problems. place, and time. Mental status is at baseline. Skin:     General: Skin is warm and dry. Psychiatric:         Mood and Affect: Mood normal.         Behavior: Behavior normal.         Thought Content: Thought content normal.         Judgment: Judgment normal.         Assessment:      1. Low libido    2. Anorgasmia of female    3. Vaginal dryness          Plan:     Will fax info for sildenafil cream  Continue couples therapy  Discuss meds with PCP  Address issues as listed above  FU 2 months w/annual exam  Return in about 3 months (around 7/5/2021) for annual.        KRYSTYNA iRtchie - CNP

## 2021-05-25 ENCOUNTER — OFFICE VISIT (OUTPATIENT)
Dept: OBGYN CLINIC | Age: 41
End: 2021-05-25
Payer: COMMERCIAL

## 2021-05-25 ENCOUNTER — HOSPITAL ENCOUNTER (OUTPATIENT)
Age: 41
Setting detail: SPECIMEN
Discharge: HOME | End: 2021-05-25
Payer: COMMERCIAL

## 2021-05-25 VITALS
DIASTOLIC BLOOD PRESSURE: 84 MMHG | BODY MASS INDEX: 23.92 KG/M2 | SYSTOLIC BLOOD PRESSURE: 124 MMHG | WEIGHT: 135 LBS | HEIGHT: 63 IN

## 2021-05-25 DIAGNOSIS — N94.9 VAGINAL DISCOMFORT: Primary | ICD-10-CM

## 2021-05-25 DIAGNOSIS — B37.31 YEAST INFECTION INVOLVING THE VAGINA AND SURROUNDING AREA: ICD-10-CM

## 2021-05-25 DIAGNOSIS — N94.9 VAGINAL DISCOMFORT: ICD-10-CM

## 2021-05-25 PROCEDURE — 4004F PT TOBACCO SCREEN RCVD TLK: CPT | Performed by: STUDENT IN AN ORGANIZED HEALTH CARE EDUCATION/TRAINING PROGRAM

## 2021-05-25 PROCEDURE — G8427 DOCREV CUR MEDS BY ELIG CLIN: HCPCS | Performed by: STUDENT IN AN ORGANIZED HEALTH CARE EDUCATION/TRAINING PROGRAM

## 2021-05-25 PROCEDURE — G8420 CALC BMI NORM PARAMETERS: HCPCS | Performed by: STUDENT IN AN ORGANIZED HEALTH CARE EDUCATION/TRAINING PROGRAM

## 2021-05-25 PROCEDURE — 99213 OFFICE O/P EST LOW 20 MIN: CPT | Performed by: STUDENT IN AN ORGANIZED HEALTH CARE EDUCATION/TRAINING PROGRAM

## 2021-05-25 RX ORDER — NYSTATIN 100000 [USP'U]/G
POWDER TOPICAL
Qty: 60 G | Refills: 1 | Status: SHIPPED | OUTPATIENT
Start: 2021-05-25

## 2021-05-25 RX ORDER — LORAZEPAM 2 MG/1
2 TABLET ORAL NIGHTLY
Status: CANCELLED | OUTPATIENT
Start: 2021-05-25

## 2021-05-25 RX ORDER — FLUCONAZOLE 150 MG/1
150 TABLET ORAL
Qty: 3 TABLET | Refills: 0 | Status: SHIPPED | OUTPATIENT
Start: 2021-05-25 | End: 2021-06-03

## 2021-05-25 NOTE — PROGRESS NOTES
[facility]   [facility] [address], [place] [address]    Problem Visit      Marina Franklin  2021                       Primary Care Physician: Nadine Argueta    CC:   Chief Complaint   Patient presents with    Other     vaginal discomfort - looks like slices on top of the labia (happened after vacation?)         HPI: Marina Franklin is a 39 y.o. female  No LMP recorded. Patient has had an ablation. The patient was seen and examined. She is here for vulvar excoriations and is complaining of irriation. Patient has a history of this which has been found to be yeast infections in the past.  She is unable to put any kind lotion on the excoriations as that burns really badly. She has been treated for yeast infections with 3 doses of Diflucan in the past with success. Patient does admit that she was recently out of town and wearing a wet bathing suit a lot of the time.     REVIEW OF SYSTEMS:  Constitutional: negative fever, negative chills  HEENT: negative visual disturbances, negative headaches  Respiratory: negative dyspnea, negative cough  Cardiovascular: negative chest pain,  negative palpitations  Gastrointestinal: negative abdominal pain, negative RUQ pain, negative N/V, negative diarrhea, negative constipation  Genitourinary: negative dysuria, positive vaginal discharge, positive vulvar excoriations  Dermatological: negative rash  Hematologic: negative bruising  Immunologic/Lymphatic: negative recent illness, negative recent sick contact  Musculoskeletal: negative back pain, negative myalgias, negative arthralgias  Neurological:  negative dizziness, negative weakness  Behavior/Psych: negative depression, negative anxiety    OBSTETRICAL HISTORY:  OB History    Para Term  AB Living   2 2 2 0 0 2   SAB TAB Ectopic Molar Multiple Live Births   0 0 0   0 2      # Outcome Date GA Lbr Pieter/2nd Weight Sex Delivery Anes PTL Lv   2 Term 14 37w0d  3 lb 2 oz (1.417 kg) M CS-LTranv   SURJIT   1 Term 09/15/12 41w4d  7 lb 1 oz (3.204 kg) M CS-LTranv   SURJIT      Birth Comments: circ 12 Dr [address] HISTORY:      Diagnosis Date    Arthritis     Atypical squamous cells of undetermined significance (ASCUS) on Papanicolaou smear of cervix 2018    HPV+    History of vaginal bleeding     constant bled for 5 yrs cause unknown    Migraine headache 7/10/2012    Osteoarthritis     with rheamuatoid arthritis    Ovarian cyst     Psoriasis     Renal lithiasis     Hx of kidney stones    Tachycardia     acute tachycardia. Had 2 ablations    Thyroid disease     treated as teen. No problem since then    Varicosities        PAST SURGICAL HISTORY:                                                                    Procedure Laterality Date    APPENDECTOMY      CARDIAC SURGERY      x 2 for tacycardia resting heart rate now      SECTION, LOW TRANSVERSE  09/15/2012    COLPOSCOPY  10/19/2018    1oclock & 6oclock LSIL    COLPOSCOPY  2019    COLPOSCOPY  2020    Bx NINO-I    CYSTOSCOPY  2018    FOOT SURGERY      for osteoarthritis metal plates in both feet    KIDNEY STONE SURGERY  2020    LAPAROSCOPY      ? endometriosis for constant vaginal bleeding    LAPAROSCOPY  2018    crystal partial salpingectomy, NovaSure ablation, hysteroscopy       MEDICATIONS:  Current Outpatient Medications   Medication Sig Dispense Refill    fluconazole (DIFLUCAN) 150 MG tablet Take 1 tablet by mouth every 72 hours for 9 days 3 tablet 0    nystatin (MYCOSTATIN) 462088 UNIT/GM powder Apply 3 times daily.  60 g 1    Certolizumab Pegol (CIMZIA SC) Inject into the skin      ibuprofen (ADVIL;MOTRIN) 600 MG tablet Take 600 mg by mouth every 6 hours as needed for Pain      ondansetron (ZOFRAN-ODT) 4 MG disintegrating tablet Take 1 tablet by mouth every 8 hours as needed for Nausea or Vomiting 6 tablet 2    oxytocin (PITOCIN) 10 UNIT/ML injection 10 Units by Other route once Indications: dissolved under tongue now       Magnesium Gluconate 550 MG TABS Take 30 mg by mouth      Taurine 1000 MG CAPS Take by mouth      ALPRAZolam (XANAX) 1 MG tablet TK 1 AND 1/2 TO 2 TS PO QID  0    NONFORMULARY URKHSANA 750mg      Cholecalciferol (VITAMIN D3) 78829 UNITS CAPS Take 1 capsule by mouth once a week      LORazepam (ATIVAN) 2 MG tablet Take 2 mg by mouth nightly       No current facility-administered medications for this visit. ALLERGIES:   Allergies as of 05/25/2021 - Fully Reviewed 05/25/2021   Allergen Reaction Noted    Quetiapine Other (See Comments) 08/17/2020    Cleocin [clindamycin hcl] Nausea And Vomiting 02/22/2019    Metronidazole Hives 06/04/2018                                   VITALS:  Vitals:    05/25/21 1509   BP: 124/84   Site: Right Upper Arm   Position: Sitting   Cuff Size: Small Adult   Weight: 135 lb (61.2 kg)   Height: 5' 3\" (1.6 m)                                                                                                                                                                   PHYSICAL EXAM:   General Appearance: Appears healthy. Alert; in no acute distress. Pleasant. Skin: Skin color, texture, turgor normal. No rashes or lesions. HEENT: normocephalic and atraumatic, Thyroid normal to inspection and palpation  Respiratory: Normal expansion. Clear to auscultation. No rales, rhonchi, or wheezing.   Cardiovascular: normal rate, normal S1 and S2, no gallops, intact distal pulses and no carotid bruits  Breast:  (Chest): N/A  Abdomen: soft, non-tender, non-distended, no right upper quadrant tenderness and no CVA tenderness  Pelvic Exam:   External genitalia: General appearance; normal, Hair distribution; normal, excoriations noted, negative for herpetic lesions  Urinary system: urethral meatus normal  Vaginal: normal mucosa, curd-like discharge  Cervix: normal appearing cervix without lesions  Rectal Exam: exam declined by patient  Musculoskeletal: no gross abnormalities  Extremities: non-tender BLE and non-edematous  Psych:  oriented to time, place and person       DATA:  No results found for this visit on 21. ASSESSMENT & PLAN:    Anni Kearney is a 39 y.o. female  No LMP recorded. Patient has had an ablation. Vaginal Discharge and external excoriations   - Hx Yeast with similar Sx   - Vag probe pending   - Diflucan x 3 doses sent to pharmacy   - Nystatin powder sent to pharmacy    Patient Active Problem List    Diagnosis Date Noted    Psoriasis      Priority: High    Migraine headache 07/10/2012     Priority: Low     Takes Vicodin PRN      Osteoarthritis      Priority: Low    Vaginal Pap smear with LGSIL 2020    Elevated blood pressure reading 2014     Labetalol 200mg BID  Updating deleted diagnoses         Return in about 4 weeks (around 2021) for Annual.    Counseling Completed:    Discussed need for repeat pap as per American Society for Colposcopy and Cervical Pathology guidelines. Discussed need for mammograms every 1 year, If >44 yo and last mammogram was negative. Discussed Calcium and Vitamin D dosing. Discussed need for colonoscopy screening as well as onset for bone density testing. Discussed birth control and barrier recommendations. Discussed STD counseling and prevention. Discussed Gardisil counseling for all patients 10-35 yo. Hereditary Breast, Ovarian, Colon and Uterine Cancer screening discussed. Tobacco & Secondary smoke risks discussed; with recommendation for cessation and avoidance. Routine health maintenance per patients PCP discussed. Patient was seen with total face to face time of 15 minutes. More than 50% of this visit was on counseling and education regarding her diagnose(s) as listed below and options. She was also counseled on her preventative health maintenance recommendations and follow-up. Diagnosis Orders   1.  Vaginal discomfort VAGINITIS DNA PROBE   2. Yeast infection involving the vagina and surrounding area  VAGINITIS DNA PROBE    fluconazole (DIFLUCAN) 150 MG tablet    nystatin (MYCOSTATIN) 870438 UNIT/GM powder         Damian Lott DO  [address] [facility], [address]  5/25/2021, 3:50 PM

## 2021-05-26 LAB
DIRECT EXAM: NORMAL
Lab: NORMAL
SPECIMEN DESCRIPTION: NORMAL

## 2021-05-27 ENCOUNTER — TELEPHONE (OUTPATIENT)
Dept: OBGYN CLINIC | Age: 41
End: 2021-05-27

## 2021-05-27 NOTE — TELEPHONE ENCOUNTER
We did not culture the open sores. She may have a yeast infection, but it is external vs internal (vaginal). I would continue with diflucan PO and lotramin powder. If not improvement, can make an appt to be seen in office again next week.     Thanks,    Shari Moorei [facility]   5/27/2021, 3:15 PM

## 2021-05-27 NOTE — TELEPHONE ENCOUNTER
Called pt back with POC. Encouraged pt to also use hair dryer on low setting prn for comfort and to help dry affected area. Scheduled office visit for tues 06/01/21.

## 2021-05-27 NOTE — TELEPHONE ENCOUNTER
38 y/o Gyn pt calling for results had open sores cultured, and vaginitis DNA probe was negative. Pt asking what it maybe and how to treat?

## 2021-06-01 ENCOUNTER — OFFICE VISIT (OUTPATIENT)
Dept: OBGYN CLINIC | Age: 41
End: 2021-06-01
Payer: COMMERCIAL

## 2021-06-01 VITALS
SYSTOLIC BLOOD PRESSURE: 120 MMHG | BODY MASS INDEX: 23.74 KG/M2 | HEIGHT: 63 IN | DIASTOLIC BLOOD PRESSURE: 66 MMHG | WEIGHT: 134 LBS

## 2021-06-01 DIAGNOSIS — N90.89 VULVAR IRRITATION: Primary | ICD-10-CM

## 2021-06-01 PROCEDURE — 4004F PT TOBACCO SCREEN RCVD TLK: CPT | Performed by: STUDENT IN AN ORGANIZED HEALTH CARE EDUCATION/TRAINING PROGRAM

## 2021-06-01 PROCEDURE — G8420 CALC BMI NORM PARAMETERS: HCPCS | Performed by: STUDENT IN AN ORGANIZED HEALTH CARE EDUCATION/TRAINING PROGRAM

## 2021-06-01 PROCEDURE — G8427 DOCREV CUR MEDS BY ELIG CLIN: HCPCS | Performed by: STUDENT IN AN ORGANIZED HEALTH CARE EDUCATION/TRAINING PROGRAM

## 2021-06-01 PROCEDURE — 99213 OFFICE O/P EST LOW 20 MIN: CPT | Performed by: STUDENT IN AN ORGANIZED HEALTH CARE EDUCATION/TRAINING PROGRAM

## 2021-06-01 SDOH — ECONOMIC STABILITY: FOOD INSECURITY: WITHIN THE PAST 12 MONTHS, YOU WORRIED THAT YOUR FOOD WOULD RUN OUT BEFORE YOU GOT MONEY TO BUY MORE.: NEVER TRUE

## 2021-06-01 SDOH — ECONOMIC STABILITY: FOOD INSECURITY: WITHIN THE PAST 12 MONTHS, THE FOOD YOU BOUGHT JUST DIDN'T LAST AND YOU DIDN'T HAVE MONEY TO GET MORE.: NEVER TRUE

## 2021-06-01 ASSESSMENT — PATIENT HEALTH QUESTIONNAIRE - PHQ9
SUM OF ALL RESPONSES TO PHQ9 QUESTIONS 1 & 2: 0
2. FEELING DOWN, DEPRESSED OR HOPELESS: 0
SUM OF ALL RESPONSES TO PHQ QUESTIONS 1-9: 0
SUM OF ALL RESPONSES TO PHQ QUESTIONS 1-9: 0
1. LITTLE INTEREST OR PLEASURE IN DOING THINGS: 0
SUM OF ALL RESPONSES TO PHQ QUESTIONS 1-9: 0

## 2021-06-01 ASSESSMENT — SOCIAL DETERMINANTS OF HEALTH (SDOH): HOW HARD IS IT FOR YOU TO PAY FOR THE VERY BASICS LIKE FOOD, HOUSING, MEDICAL CARE, AND HEATING?: NOT HARD AT ALL

## 2021-06-01 NOTE — PROGRESS NOTES
[facility]   [facility] [address], [place] [address]    Problem Visit      Yared Park  2021                       Primary Care Physician: Tamika Hairston    CC:   Chief Complaint   Patient presents with    Follow-up     1 week follow up lesions          HPI: Yared Park is a 39 y.o. female  No LMP recorded. Patient has had an ablation. The patient was seen and examined. She is here for vulvar yeast follow up and is complaining of mild irritation. Patient was seen in the office last week complaining of lesions that she gets when she gets vulvar yeast infections. A vaginitis probe was done and no vaginal yeast infection was found. Patient was given Diflucan and Lotrimin powder and has found significant improvement. She still has 1 dose of Diflucan left to take tomorrow. She still is complaining of a little bit of discomfort but this is much improved from last week. Patient has been tested for herpetic lesions multiple times with negative results each time. She denies any prodromal symptoms.     REVIEW OF SYSTEMS:  Constitutional: negative fever, negative chills  HEENT: negative visual disturbances, negative headaches  Respiratory: negative dyspnea, negative cough  Cardiovascular: negative chest pain,  negative palpitations  Gastrointestinal: negative abdominal pain, negative RUQ pain, negative N/V, negative diarrhea, negative constipation  Genitourinary: negative dysuria, negative vaginal discharge, positive vulvar discomfort  Dermatological: negative rash  Hematologic: negative bruising  Immunologic/Lymphatic: negative recent illness, negative recent sick contact  Musculoskeletal: negative back pain, negative myalgias, negative arthralgias  Neurological:  negative dizziness, negative weakness  Behavior/Psych: negative depression, negative anxiety    OBSTETRICAL HISTORY:  OB History    Para Term  AB Living   2 2 2 0 0 2   SAB TAB Ectopic Molar Multiple Live Births   0 0 0   0 2      # Outcome Date GA Lbr Pieter/2nd Weight Sex Delivery Anes PTL Lv   2 Term 14 37w0d  3 lb 2 oz (1.417 kg) M CS-LTranv   SURJIT   1 Term 09/15/12 41w4d  7 lb 1 oz (3.204 kg) M CS-LTranv   SURJIT      Birth Comments: circ 12 [facility] HISTORY:      Diagnosis Date    Arthritis     Atypical squamous cells of undetermined significance (ASCUS) on Papanicolaou smear of cervix 2018    HPV+    History of vaginal bleeding     constant bled for 5 yrs cause unknown    Migraine headache 7/10/2012    Osteoarthritis     with rheamuatoid arthritis    Ovarian cyst     Psoriasis     Renal lithiasis     Hx of kidney stones    Tachycardia     acute tachycardia. Had 2 ablations    Thyroid disease     treated as teen. No problem since then    Varicosities        PAST SURGICAL HISTORY:                                                                    Procedure Laterality Date    APPENDECTOMY      CARDIAC SURGERY      x 2 for tacycardia resting heart rate now      SECTION, LOW TRANSVERSE  09/15/2012    COLPOSCOPY  10/19/2018    1oclock & 6oclock LSIL    COLPOSCOPY  2019    COLPOSCOPY  2020    Bx NINO-I    CYSTOSCOPY  2018    FOOT SURGERY      for osteoarthritis metal plates in both feet    KIDNEY STONE SURGERY  2020    LAPAROSCOPY      ? endometriosis for constant vaginal bleeding    LAPAROSCOPY  2018    crystal partial salpingectomy, NovaSure ablation, hysteroscopy       MEDICATIONS:  Current Outpatient Medications   Medication Sig Dispense Refill    fluconazole (DIFLUCAN) 150 MG tablet Take 1 tablet by mouth every 72 hours for 9 days 3 tablet 0    nystatin (MYCOSTATIN) 202535 UNIT/GM powder Apply 3 times daily.  60 g 1    Certolizumab Pegol (CIMZIA SC) Inject into the skin      ibuprofen (ADVIL;MOTRIN) 600 MG tablet Take 600 mg by mouth every 6 hours as needed for Pain      ondansetron (ZOFRAN-ODT) 4 MG disintegrating tablet Take 1 tablet by mouth every 8 hours as needed for Nausea or Vomiting 6 tablet 2    oxytocin (PITOCIN) 10 UNIT/ML injection 10 Units by Other route once Indications: dissolved under tongue now       Magnesium Gluconate 550 MG TABS Take 30 mg by mouth      Taurine 1000 MG CAPS Take by mouth      ALPRAZolam (XANAX) 1 MG tablet TK 1 AND 1/2 TO 2 TS PO QID  0    NONFORMULARY RUKHSANA 750mg      Cholecalciferol (VITAMIN D3) 75900 UNITS CAPS Take 1 capsule by mouth once a week      LORazepam (ATIVAN) 2 MG tablet Take 2 mg by mouth nightly       No current facility-administered medications for this visit. ALLERGIES:   Allergies as of 06/01/2021 - Fully Reviewed 06/01/2021   Allergen Reaction Noted    Quetiapine Other (See Comments) 08/17/2020    Cleocin [clindamycin hcl] Nausea And Vomiting 02/22/2019    Metronidazole Hives 06/04/2018                                   VITALS:  Vitals:    06/01/21 1346   BP: 120/66   Site: Right Upper Arm   Position: Sitting   Cuff Size: Small Adult   Weight: 134 lb (60.8 kg)   Height: 5' 3\" (1.6 m)                                                                                                                                                                        PHYSICAL EXAM:   General Appearance: Appears healthy. Alert; in no acute distress. Pleasant. Skin: Skin color, texture, turgor normal. No rashes or lesions. HEENT: normocephalic and atraumatic, Thyroid normal to inspection and palpation  Respiratory: Normal expansion. Clear to auscultation. No rales, rhonchi, or wheezing. Cardiovascular: normal rate, normal S1 and S2, no gallops, intact distal pulses and no carotid bruits  Breast:  (Chest): N/A  Abdomen: soft, non-tender, non-distended, no right upper quadrant tenderness and no CVA tenderness  Pelvic Exam:   External genitalia: Normal-appearing external genitalia with some Lotrimin powder present.   No lesions identified at this time.  Urinary system: urethral meatus normal  Rectal Exam: exam declined by patient  Musculoskeletal: no gross abnormalities  Extremities: non-tender BLE and non-edematous  Psych:  oriented to time, place and person       DATA:  No results found for this visit on 21. ASSESSMENT & PLAN:    Anni Kearney is a 39 y.o. female  No LMP recorded. Patient has had an ablation. Vulvar Yeast Infection   - Improvement with Diflucan and Lotrimin powder. - Patient to call if new lesions pop up so they can be cultured to definitively rule out herpetic lesions. Patient Active Problem List    Diagnosis Date Noted    Psoriasis      Priority: High    Migraine headache 07/10/2012     Priority: Low     Takes Vicodin PRN      Osteoarthritis      Priority: Low    Vaginal Pap smear with LGSIL 2020    Elevated blood pressure reading 2014     Labetalol 200mg BID  Updating deleted diagnoses         Return in about 2 weeks (around 6/15/2021) for Annual.    Counseling Completed:    Discussed need for repeat pap as per American Society for Colposcopy and Cervical Pathology guidelines. Discussed need for mammograms every 1 year, If >44 yo and last mammogram was negative. Discussed Calcium and Vitamin D dosing. Discussed need for colonoscopy screening as well as onset for bone density testing. Discussed birth control and barrier recommendations. Discussed STD counseling and prevention. Discussed Gardisil counseling for all patients 10-35 yo. Hereditary Breast, Ovarian, Colon and Uterine Cancer screening discussed. Tobacco & Secondary smoke risks discussed; with recommendation for cessation and avoidance. Routine health maintenance per patients PCP discussed. Patient was seen with total face to face time of 15 minutes. More than 50% of this visit was on counseling and education regarding her diagnose(s) as listed below and options.  She was also counseled on her preventative health maintenance recommendations and follow-up. Diagnosis Orders   1.  Vulvar irritation           Zara Ludwig DO  [address] [facility]  6/1/2021, 2:06 PM

## 2021-06-17 ENCOUNTER — HOSPITAL ENCOUNTER (OUTPATIENT)
Age: 41
Setting detail: SPECIMEN
Discharge: HOME | End: 2021-06-17
Payer: COMMERCIAL

## 2021-06-17 ENCOUNTER — OFFICE VISIT (OUTPATIENT)
Dept: OBGYN CLINIC | Age: 41
End: 2021-06-17
Payer: COMMERCIAL

## 2021-06-17 VITALS
SYSTOLIC BLOOD PRESSURE: 108 MMHG | WEIGHT: 134 LBS | BODY MASS INDEX: 23.74 KG/M2 | HEIGHT: 63 IN | DIASTOLIC BLOOD PRESSURE: 68 MMHG

## 2021-06-17 DIAGNOSIS — Z01.419 WOMEN'S ANNUAL ROUTINE GYNECOLOGICAL EXAMINATION: Primary | ICD-10-CM

## 2021-06-17 DIAGNOSIS — N95.1 MENOPAUSAL SYMPTOMS: ICD-10-CM

## 2021-06-17 DIAGNOSIS — Z12.31 ENCOUNTER FOR SCREENING MAMMOGRAM FOR BREAST CANCER: ICD-10-CM

## 2021-06-17 PROCEDURE — 99396 PREV VISIT EST AGE 40-64: CPT | Performed by: STUDENT IN AN ORGANIZED HEALTH CARE EDUCATION/TRAINING PROGRAM

## 2021-06-17 NOTE — PROGRESS NOTES
[facility] OB/GY[facility]   [facility] [address]  [address]      Anni Kearney  [birth year]21                       Primary Care Physician: Ashley Oconnor    CC:   Chief Complaint   Patient presents with    Annual Exam     pap 20 LSIL HPV+, felice 19 neg         HPI: Anni Kearney is a 39 y.o. female  No LMP recorded. Patient has had an ablation. The patient was seen and examined. She is here for an annual visit. She is complaining of lack of sex drive. Patient has felt this way for about a year and has had difficulty achieving an orgasm. Patient was put on progesterone nightly by her PCP due to insomnia and what appears to be menopausal symptoms. We will check menopausal labs at this time. If in menopause, patient will require hormone replacement therapy. Patient had her fallopian tubes removed with an endometrial ablation. She has very minimal spotting. Her bowel habits are regular. She denies any bloating. She denies dysuria. She denies urinary leaking. She denies vaginal discharge. She is sexually active with single partner, contraception - tubal ligation. She uses bilateral tubal ligation for contraception and is not desiring pregnancy.     Depression Screen: Symptoms of decreased mood absent  Symptoms of anhedonia absent  **If either question is answered in a  positive fashion then complete the PHQ9 Scoring Evaluation and make the appropriate referral**    REVIEW OF SYSTEMS:   Constitutional: negative fever, negative chills, positive hot flushes  HEENT: negative visual disturbances, negative headaches  Respiratory: negative dyspnea, negative cough  Cardiovascular: negative chest pain,  negative palpitations  Gastrointestinal: negative abdominal pain, negative RUQ pain, negative N/V, negative diarrhea, negative constipation  Genitourinary: negative dysuria, negative vaginal discharge  Dermatological: negative rash  Hematologic: negative bruising  Immunologic/Lymphatic: negative recent illness, negative recent sick contact  Musculoskeletal: negative back pain, negative myalgias, negative arthralgias  Neurological:  negative dizziness, negative weakness  Behavior/Psych: negative depression, negative anxiety      GYNECOLOGICAL HISTORY:  Age of Menarche: 15  Age of Menopause: N/A     STD History: no past history    Pap History: Last PAP was abnormal;  2020 - LGSIL; Low Grade Intraepithelial Lesion. W/ + Other HRHPV. Colposcopy History: admits to 2020 CIN1. Repeat due 2021. Permanent Sterilization: yes - tubal with novasure  Reversible Birth Control: no  Hormone Replacement Exposure: no    OBSTETRICAL HISTORY:  OB History    Para Term  AB Living   2 2 2 0 0 2   SAB TAB Ectopic Molar Multiple Live Births   0 0 0 0 0 2      # Outcome Date GA Lbr Pieter/2nd Weight Sex Delivery Anes PTL Lv   2 Term 14 37w0d  3 lb 2 oz (1.417 kg) M CS-LTranv   SURJIT      Name: James Aw: 8  Apgar5: 9   1 Term 09/15/12 41w4d  7 lb 1 oz (3.204 kg) M CS-LTranv   SURJIT      Birth Comments: circ 12 Dr Nayeli Shah      Name: Eri Roots: 7  Apgar5: 9       PAST MEDICAL HISTORY:   has a past medical history of Arthritis, Atypical squamous cells of undetermined significance (ASCUS) on Papanicolaou smear of cervix, History of vaginal bleeding, Migraine headache, Osteoarthritis, Ovarian cyst, Psoriasis, Renal lithiasis, Tachycardia, Thyroid disease, and Varicosities. PAST SURGICAL HISTORY:   has a past surgical history that includes Foot surgery; laparoscopy;  section, low transverse (09/15/2012); Cardiac surgery; Appendectomy; laparoscopy (2018); Cystoscopy (2018); Colposcopy (10/19/2018); Colposcopy (2019); Colposcopy (2020); and Kidney stone surgery (2020). ALLERGIES:  is allergic to quetiapine, cleocin [clindamycin hcl], and metronidazole.     MEDICATIONS:  Prior to Admission medications    Medication Sig Start Date End Date Taking? Authorizing Provider   nystatin (MYCOSTATIN) 349232 UNIT/GM powder Apply 3 times daily. 21  Yes Roberto Hung,    Certolizumab Pegol (CIMZIA SC) Inject into the skin   Yes Historical Provider, MD   ibuprofen (ADVIL;MOTRIN) 600 MG tablet Take 600 mg by mouth every 6 hours as needed for Pain   Yes Historical Provider, MD   ondansetron (ZOFRAN-ODT) 4 MG disintegrating tablet Take 1 tablet by mouth every 8 hours as needed for Nausea or Vomiting 19  Yes Marie Moreno MD   oxytocin (PITOCIN) 10 UNIT/ML injection 10 Units by Other route once Indications: dissolved under tongue now    Yes Historical Provider, MD   Magnesium Gluconate 550 MG TABS Take 30 mg by mouth   Yes Historical Provider, MD   Taurine 1000 MG CAPS Take by mouth   Yes Historical Provider, MD   ALPRAZolam (XANAX) 1 MG tablet TK 1 AND 1/2 TO 2 TS PO QID 17  Yes Historical Provider, MD   NONFORMULARY RUKHSANA 750mg   Yes Historical Provider, MD   Cholecalciferol (VITAMIN D3) 25951 UNITS CAPS Take 1 capsule by mouth once a week   Yes Historical Provider, MD   LORazepam (ATIVAN) 2 MG tablet Take 2 mg by mouth nightly   Yes Historical Provider, MD       FAMILY HISTORY:  Family History of Breast, Ovarian, Colon or Uterine Cancer: Yes, aunt with ovarian cancer in 45s  family history includes Cancer in her maternal aunt and another family member; Depression in her mother; Heart Disease in her father; High Cholesterol in her father; Hypertension in her father, mother, and paternal grandmother; Other in her father and maternal grandfather; Ovarian Cancer in her paternal aunt; Stroke in her father; Thyroid Disease in her mother. SOCIAL HISTORY:   reports that she has been smoking cigarettes. She has been smoking about 0.50 packs per day. She has never used smokeless tobacco. She reports previous alcohol use. She reports current drug use. Drug: Opiates .     HEALTH MAINTENANCE:  Immunization status: stated as up to date, today.   Colonoscopy: N/A   Pap Smears: LSIL w/ Other HRHPV 2020. Colpo CIN1. Next due now. Family Planning: Tubal w/ Novasure   DEXA: N/A    Menopausal symptoms   - FSH, estradiol pending    FHx Ovarian cancer   - Aunt in 45s  from ovarian cancer   - Referral to genetic counselor    Patient Active Problem List    Diagnosis Date Noted    Psoriasis      Priority: High    Migraine headache 07/10/2012     Priority: Low     Takes Vicodin PRN      Osteoarthritis      Priority: Low    Vaginal Pap smear with LGSIL 2020    Elevated blood pressure reading 2014     Labetalol 200mg BID  Updating deleted diagnoses         Return in about 1 year (around 2022) for Annual.  No Patient Care Coordination Note on file. Counseling Completed:    Discussed need for repeat pap as per American Society for Colposcopy and Cervical Pathology guidelines. Discussed need for mammograms every 1 year, If >44 yo and last mammogram was negative. Discussed Calcium and Vitamin D dosing. Discussed need for colonoscopy screening as well as onset for bone density testing. Discussed birth control and barrier recommendations. Discussed STD counseling and prevention. Discussed Gardisil counseling for all patients 10-35 yo. Hereditary Breast, Ovarian, Colon and Uterine Cancer screening discussed. Tobacco & Secondary smoke risks discussed; with recommendation for cessation and avoidance. Routine health maintenance per patients PCP discussed. Diagnosis Orders   1. Women's annual routine gynecological examination  PAP SMEAR   2. Encounter for screening mammogram for breast cancer  ABELARDO VASHTI DIGITAL SCREEN BILATERAL   3.  Menopausal symptoms  Follicle Stimulating Hormone    Estradiol        Damian Acre, [address] OB/[facility]N, [facility]  2021, 2:22 PM

## 2021-06-22 LAB — CYTOLOGY REPORT: NORMAL

## 2021-07-27 ENCOUNTER — HOSPITAL ENCOUNTER (OUTPATIENT)
Age: 41
Setting detail: SPECIMEN
Discharge: HOME | End: 2021-07-27
Payer: COMMERCIAL

## 2021-07-27 ENCOUNTER — PROCEDURE VISIT (OUTPATIENT)
Dept: OBGYN CLINIC | Age: 41
End: 2021-07-27
Payer: COMMERCIAL

## 2021-07-27 VITALS
WEIGHT: 133 LBS | HEIGHT: 63 IN | SYSTOLIC BLOOD PRESSURE: 122 MMHG | DIASTOLIC BLOOD PRESSURE: 80 MMHG | BODY MASS INDEX: 23.57 KG/M2

## 2021-07-27 DIAGNOSIS — B97.7 HPV IN FEMALE: ICD-10-CM

## 2021-07-27 DIAGNOSIS — R87.612 LOW GRADE SQUAMOUS INTRAEPITHELIAL LESION ON CYTOLOGIC SMEAR OF CERVIX (LGSIL): Primary | ICD-10-CM

## 2021-07-27 DIAGNOSIS — R10.2 PELVIC PAIN: ICD-10-CM

## 2021-07-27 PROCEDURE — 57454 BX/CURETT OF CERVIX W/SCOPE: CPT | Performed by: STUDENT IN AN ORGANIZED HEALTH CARE EDUCATION/TRAINING PROGRAM

## 2021-07-27 NOTE — PROGRESS NOTES
[facility] OB/GYN   [facility] [address] [facility],  [address]    Procedure Note    Francis James  7/27/2021                       Primary Care Physician: Andrae Officer      Subjective:   Francis James 39 y.o. female [id number] is here for previously scheduled colposcopy due to history of LSIL with + Other HRHPV. No LMP recorded. Patient has had an ablation. . She has no complaints today. Vitals:   Blood pressure 122/80, height 5' 3\" (1.6 m), weight 133 lb (60.3 kg), not currently breastfeeding. Procedure: Colposcopy, Biopsies and ECC    Indications: LSIL with + Other HRHPV    Procedure Details: The patient was counseled on the procedure. Risks, benefits and alternatives were reviewed. The patient is aware that this is diagnostic and not curative and a second procedure may be needed. A consent was reviewed and obtained. Colposcopy w/ Biopsies and Endocervical Curettage  A sterile speculum was placed into the vagina and the cervix was identified. The colposcope was positioned and the cervix was examined revealing no visible lesions. Green light was used to evaluate the vessels, revealing  abnormal vessels noted at 12 o'clock. Acetoacetic acid was applied to the cervix, revealing acetowhite lesion(s) noted at 12 and 6 o'clock. Lugol's Iodine was applied to the cervix revealing  no visible lesions. The exam was considered to be satisfactory with the transformation zone visualized. Biopsies were taken at 12, 2 and 6 O'clock. Silver nitrate sticks were used for hemostasis. Good hemostasis was observed. Biopsy tissue was sent to pathology. Endocervical curettage was then performed and tissue collected was sent to pathology. Impression: Satisfactory colposcopy acetowhite lesion(s) noted at 12 and 6 o'clock and abnormal vessels noted at 2 o'clock    The patient tolerated the procedure well. Post procedure restrictions were reviewed and given to the patient.   All counts and instruments were correct at the end of the procedure. Lab:  Pregnancy Test:  Lab Results   Component Value Date    PREGTESTUR positive 04/07/2014    HCGQUANT 4,842 (H) 03/27/2014       Assessment & Plan:  Shailesh Merritt 39 y.o. female [id number]  Patient Active Problem List    Diagnosis Date Noted    Psoriasis      Priority: High    Migraine headache 07/10/2012     Priority: Low     Takes Vicodin PRN      Osteoarthritis      Priority: Low    Vaginal Pap smear with LGSIL 09/28/2020    Elevated blood pressure reading 11/12/2014     Labetalol 200mg BID  Updating deleted diagnoses         The patient was counseled on follow up and home care with restrictions noted. Return in about 11 months (around 6/27/2022) for Annual.;     Patient also complaining of some right lower quadrant pain. Will assess with a GYN ultrasound.     Stephan Cast, DO  Ob/Gyn   [facility], [address]  7/27/2021, 1:47 PM

## 2021-07-29 LAB — SURGICAL PATHOLOGY REPORT: NORMAL

## 2021-09-14 ENCOUNTER — OFFICE VISIT (OUTPATIENT)
Dept: OBGYN CLINIC | Age: 41
End: 2021-09-14
Payer: COMMERCIAL

## 2021-09-14 VITALS
BODY MASS INDEX: 23.57 KG/M2 | DIASTOLIC BLOOD PRESSURE: 72 MMHG | WEIGHT: 133 LBS | HEIGHT: 63 IN | SYSTOLIC BLOOD PRESSURE: 128 MMHG

## 2021-09-14 DIAGNOSIS — Z71.2 ENCOUNTER TO DISCUSS TEST RESULTS: Primary | ICD-10-CM

## 2021-09-14 PROCEDURE — 4004F PT TOBACCO SCREEN RCVD TLK: CPT | Performed by: STUDENT IN AN ORGANIZED HEALTH CARE EDUCATION/TRAINING PROGRAM

## 2021-09-14 PROCEDURE — 99213 OFFICE O/P EST LOW 20 MIN: CPT | Performed by: STUDENT IN AN ORGANIZED HEALTH CARE EDUCATION/TRAINING PROGRAM

## 2021-09-14 PROCEDURE — G8427 DOCREV CUR MEDS BY ELIG CLIN: HCPCS | Performed by: STUDENT IN AN ORGANIZED HEALTH CARE EDUCATION/TRAINING PROGRAM

## 2021-09-14 PROCEDURE — G8420 CALC BMI NORM PARAMETERS: HCPCS | Performed by: STUDENT IN AN ORGANIZED HEALTH CARE EDUCATION/TRAINING PROGRAM

## 2021-09-14 NOTE — PROGRESS NOTES
09/15/12 41w4d  7 lb 1 oz (3.204 kg) M CS-LTranv   SURJIT      Birth Comments: circ 12 Dr Ambrosio Lock HISTORY:      Diagnosis Date    Arthritis     Atypical squamous cells of undetermined significance (ASCUS) on Papanicolaou smear of cervix 2018    HPV+    History of vaginal bleeding     constant bled for 5 yrs cause unknown    Migraine headache 7/10/2012    Osteoarthritis     with rheamuatoid arthritis    Ovarian cyst     Psoriasis     Renal lithiasis     Hx of kidney stones    Tachycardia     acute tachycardia. Had 2 ablations    Thyroid disease     treated as teen. No problem since then    Varicosities        PAST SURGICAL HISTORY:                                                                    Procedure Laterality Date    APPENDECTOMY      CARDIAC SURGERY      x 2 for tacycardia resting heart rate now      SECTION, LOW TRANSVERSE  09/15/2012    COLPOSCOPY  10/19/2018    1oclock & 6oclock LSIL    COLPOSCOPY  2019    COLPOSCOPY  2020    Bx NINO-I    CYSTOSCOPY  2018    FOOT SURGERY      for osteoarthritis metal plates in both feet    KIDNEY STONE SURGERY  2020    LAPAROSCOPY      ? endometriosis for constant vaginal bleeding    LAPAROSCOPY  2018    crystal partial salpingectomy, NovaSure ablation, hysteroscopy       MEDICATIONS:  Current Outpatient Medications   Medication Sig Dispense Refill    nystatin (MYCOSTATIN) 525425 UNIT/GM powder Apply 3 times daily.  60 g 1    Certolizumab Pegol (CIMZIA SC) Inject into the skin      ibuprofen (ADVIL;MOTRIN) 600 MG tablet Take 600 mg by mouth every 6 hours as needed for Pain      ondansetron (ZOFRAN-ODT) 4 MG disintegrating tablet Take 1 tablet by mouth every 8 hours as needed for Nausea or Vomiting 6 tablet 2    oxytocin (PITOCIN) 10 UNIT/ML injection 10 Units by Other route once Indications: dissolved under tongue now       Magnesium Gluconate 550 MG TABS Take 30 mg by mouth      Taurine 1000 MG CAPS Take by mouth      ALPRAZolam (XANAX) 1 MG tablet TK 1 AND 1/2 TO 2 TS PO QID  0    NONFORMULARY RUKHSANA 750mg      Cholecalciferol (VITAMIN D3) 43419 UNITS CAPS Take 1 capsule by mouth once a week      LORazepam (ATIVAN) 2 MG tablet Take 2 mg by mouth nightly       No current facility-administered medications for this visit. ALLERGIES:   Allergies as of 09/14/2021 - Fully Reviewed 09/14/2021   Allergen Reaction Noted    Quetiapine Other (See Comments) 08/17/2020    Cleocin [clindamycin hcl] Nausea And Vomiting 02/22/2019    Metronidazole Hives 06/04/2018                                   VITALS:  Vitals:    09/14/21 1124   BP: 128/72   Site: Right Upper Arm   Position: Sitting   Cuff Size: Small Adult   Weight: 133 lb (60.3 kg)   Height: 5' 3\" (1.6 m)                                                                                                                                                                           PHYSICAL EXAM:   General Appearance: Appears healthy. Alert; in no acute distress. Pleasant. Skin: Skin color, texture, turgor normal. No rashes or lesions. HEENT: normocephalic and atraumatic, Thyroid normal to inspection and palpation  Respiratory: Normal expansion. Clear to auscultation. No rales, rhonchi, or wheezing. Cardiovascular: normal rate, normal S1 and S2, no gallops, intact distal pulses and no carotid bruits  Breast:  (Chest): N/A  Abdomen: soft, non-tender, non-distended, no right upper quadrant tenderness and no CVA tenderness  Pelvic Exam:   External genitalia: General appearance; normal, Hair distribution; normal, Healing psoriasis noted along both labia majora.  No ulcerations noted or signs of yeast.  Urinary system: urethral meatus normal  Rectal Exam: exam declined by patient  Musculoskeletal: no gross abnormalities  Extremities: non-tender BLE and non-edematous  Psych:  oriented to time, place and person       DATA:  No results found for this

## 2021-09-23 ENCOUNTER — HOSPITAL ENCOUNTER (OUTPATIENT)
Age: 41
Setting detail: SPECIMEN
Discharge: HOME | End: 2021-09-23
Payer: COMMERCIAL

## 2021-09-23 ENCOUNTER — OFFICE VISIT (OUTPATIENT)
Dept: OBGYN CLINIC | Age: 41
End: 2021-09-23
Payer: COMMERCIAL

## 2021-09-23 VITALS
BODY MASS INDEX: 23.25 KG/M2 | DIASTOLIC BLOOD PRESSURE: 84 MMHG | SYSTOLIC BLOOD PRESSURE: 118 MMHG | HEIGHT: 63 IN | WEIGHT: 131.2 LBS

## 2021-09-23 DIAGNOSIS — N89.8 VAGINAL DISCHARGE: ICD-10-CM

## 2021-09-23 DIAGNOSIS — Z11.3 ROUTINE SCREENING FOR STI (SEXUALLY TRANSMITTED INFECTION): ICD-10-CM

## 2021-09-23 DIAGNOSIS — N89.8 VAGINAL DISCHARGE: Primary | ICD-10-CM

## 2021-09-23 LAB
DIRECT EXAM: ABNORMAL
Lab: ABNORMAL
SPECIMEN DESCRIPTION: ABNORMAL

## 2021-09-23 PROCEDURE — G8427 DOCREV CUR MEDS BY ELIG CLIN: HCPCS | Performed by: NURSE PRACTITIONER

## 2021-09-23 PROCEDURE — G8420 CALC BMI NORM PARAMETERS: HCPCS | Performed by: NURSE PRACTITIONER

## 2021-09-23 PROCEDURE — 99213 OFFICE O/P EST LOW 20 MIN: CPT | Performed by: NURSE PRACTITIONER

## 2021-09-23 PROCEDURE — 4004F PT TOBACCO SCREEN RCVD TLK: CPT | Performed by: NURSE PRACTITIONER

## 2021-09-23 NOTE — PROGRESS NOTES
Vaginitis: Patient complains of an abnormal vaginal discharge for 4 days. Vaginal symptoms include discharge described as white/yellow, local irritation, vulvar itching and burning. Vulvar symptoms include discharge described as white and creamy. STI Risk: Possible STD exposure   Discharge described as: normal and physiologic . Menstrual pattern: She has not been bleeding ablation   Contraception: tubal ligation     There were no vitals taken for this visit. ALLERGIES:  See allergy list  O-  Physical Exam  Genitourinary:     General: Normal vulva. Labia:         Right: No rash, tenderness, lesion or injury. Left: No rash, tenderness, lesion or injury. Vagina: Normal. No foreign body. No vaginal discharge, erythema, tenderness, bleeding or lesions. A.Vaginitis  options. P. Affirm collected and sent to lab  Will treat w/Flagyl for BV or Diflucan if yeast pending results  GC/CT  She was also counseled on her preventative health maintenance recommendations and follow-up.   RTO annual exam and PRN (6/17/21)

## 2021-09-24 DIAGNOSIS — B96.89 BV (BACTERIAL VAGINOSIS): ICD-10-CM

## 2021-09-24 DIAGNOSIS — B37.31 VAGINAL YEAST INFECTION: ICD-10-CM

## 2021-09-24 DIAGNOSIS — N76.0 BV (BACTERIAL VAGINOSIS): ICD-10-CM

## 2021-09-24 LAB
C TRACH DNA GENITAL QL NAA+PROBE: NEGATIVE
N. GONORRHOEAE DNA: NEGATIVE
SPECIMEN DESCRIPTION: NORMAL

## 2021-09-24 RX ORDER — METRONIDAZOLE 7.5 MG/G
1 GEL VAGINAL NIGHTLY
Qty: 1 EACH | Refills: 0 | Status: SHIPPED | OUTPATIENT
Start: 2021-09-24 | End: 2021-10-01

## 2021-12-01 ENCOUNTER — OFFICE VISIT (OUTPATIENT)
Dept: OBGYN CLINIC | Age: 41
End: 2021-12-01
Payer: COMMERCIAL

## 2021-12-01 ENCOUNTER — HOSPITAL ENCOUNTER (OUTPATIENT)
Age: 41
Setting detail: SPECIMEN
Discharge: HOME | End: 2021-12-01

## 2021-12-01 VITALS
BODY MASS INDEX: 20.73 KG/M2 | DIASTOLIC BLOOD PRESSURE: 80 MMHG | SYSTOLIC BLOOD PRESSURE: 116 MMHG | WEIGHT: 117 LBS | HEIGHT: 63 IN

## 2021-12-01 DIAGNOSIS — N89.8 VAGINAL ITCHING: Primary | ICD-10-CM

## 2021-12-01 DIAGNOSIS — N89.8 VAGINAL IRRITATION: ICD-10-CM

## 2021-12-01 DIAGNOSIS — N89.8 VAGINAL ITCHING: ICD-10-CM

## 2021-12-01 PROCEDURE — 99213 OFFICE O/P EST LOW 20 MIN: CPT

## 2021-12-01 PROCEDURE — 4004F PT TOBACCO SCREEN RCVD TLK: CPT

## 2021-12-01 PROCEDURE — G8428 CUR MEDS NOT DOCUMENT: HCPCS

## 2021-12-01 PROCEDURE — G8420 CALC BMI NORM PARAMETERS: HCPCS

## 2021-12-01 PROCEDURE — G8484 FLU IMMUNIZE NO ADMIN: HCPCS

## 2021-12-01 NOTE — PROGRESS NOTES
[facility] [address]     Angelina Escobedo is a 39 y.o. y.o. female who presents today for had concerns including Vaginal Odor (sore, itchy - tried natural remedies, pt does have psoriasis). Had odor, resolved with home remedies. She notes current labial redness. External labial soreness and itching. Feels like a skin tear, rawPatient complains of an abnormal vaginal  Symptoms for 1 week. Vaginal symptoms include local irritation, vulvar itching, pain and post coital bleeding. STI Risk: Very low risk of STD exposure   Menstrual pattern: no periods  Contraception: tubal ligation and ablation      9/23/21 tested positive for BV  Tested but negative for bv/yeast in March and May of 2021. Went to see her dermatologist yesterday who recommended she come be tested for BV/Yeast. Has had psoriasis on/in the vagina before. Cannot tolerate flagyl, requesting metrogel if positive for BV. If yeast, requesting 3d regimen of diflucan. /80 (Site: Right Upper Arm, Position: Sitting, Cuff Size: Small Adult)   Ht 5' 3\" (1.6 m)   Wt 117 lb (53.1 kg)   BMI 20.73 kg/m²     Allergies: Allergies   Allergen Reactions    Quetiapine Other (See Comments)     amnesia    Cleocin [Clindamycin Hcl] Nausea And Vomiting     Nausea, vomiting, dizzy, tomasz, itching back, legs, neck and arms.  Metronidazole Hives      Review of Systems:  Review of Systems   Genitourinary: Positive for dyspareunia, pelvic pain, vaginal bleeding (once after sex) and vaginal pain (soreness, tenderness). Negative for decreased urine volume, difficulty urinating, frequency, genital sores, hematuria, menstrual problem, urgency and vaginal discharge. All other systems reviewed and are negative. Physical Exam:  Physical Exam  Constitutional:       General: She is not in acute distress. Appearance: Normal appearance. She is well-developed and well-groomed.  She is not

## 2021-12-01 NOTE — PATIENT INSTRUCTIONS
Patient Education        HPV Vaccine: Care Instructions  Your Care Instructions  The HPV (human papillomavirus) vaccine protects against HPV. HPV is a common sexually transmitted infection (STI). There are many types of HPV. Some types of the virus can cause genital warts in men and women. Other types can cause cervical or oral cancer and some uncommon cancers, such as anal and vaginal cancer. Experts recommend that girls and boys age 6 or 15 get the HPV vaccine, but the vaccine can be given from age 5 to 32. If you are age 32 to 39 and have not been vaccinated for HPV, ask your doctor if getting the vaccine is right for you. Children ages 5 to 15 get the vaccine in a series of two shots over 6 months. Anyone age 13 and older gets the vaccine as a three-dose series. For the vaccine to work best, all shots in the series must be given. The best time to get the vaccine is before a person becomes sexually active. This is because the vaccine works best before there is any chance of infection with HPV. When the vaccine is given at this time, it can prevent almost all infection by the types of HPV the vaccine guards against. If someone has already been infected with the virus, the vaccine does not provide protection against the virus. Having the HPV vaccine does not change a woman's need for Pap tests. Women who have had the HPV vaccine should follow the same Pap test schedule as women who have not had the vaccine. Follow-up care is a key part of your treatment and safety. Be sure to make and go to all appointments, and call your doctor if you are having problems. It's also a good idea to know your test results and keep a list of the medicines you take. How can you care for yourself at home? · Common side effects of getting the vaccine include headache, fever, and redness or swelling at the site of the shot.  Take an over-the-counter pain medicine, such as acetaminophen (Tylenol) or ibuprofen (Advil, Motrin), if you have any of these side effects after the shot. Be safe with medicines. Read and follow all instructions on the label. · Put ice or a cold pack on the sore area for 10 to 20 minutes at a time. Put a thin cloth between the ice and your skin. · If you are a parent of a child who's getting the shot, talk to your child about HPV and the vaccine. It's a chance to teach your child about safer sex and STIs. Having your child get the shot doesn't mean you're giving your child permission to have sex. When should you call for help? Call 911 anytime you think you may need emergency care. For example, call if:    · You have symptoms of a severe allergic reaction. These may include:  ? Sudden raised, red areas (hives) all over your body. ? Swelling of the throat, mouth, lips, or tongue. ? Trouble breathing. ? Passing out (losing consciousness). Or you may feel very lightheaded or suddenly feel weak, confused, or restless. Call your doctor now or seek immediate medical care if:    · You have symptoms of an allergic reaction, such as:  ? A rash or hives (raised, red areas on the skin). ? Itching. ? Swelling. ? Belly pain, nausea, or vomiting.     · You have a fever for more than 1 day. Watch closely for changes in your health, and be sure to contact your doctor if you have any problems. Where can you learn more? Go to https://7-bites.[employer]ish. org and sign in to your [employer] account. Enter C525 in the [facility] box to learn more about \"HPV Vaccine: Care Instructions. \"     If you do not have an account, please click on the \"Sign Up Now\" link. Current as of: August 31, 2020               Content Version: 13.0  © 2006-2021 [employer]wise, [employer]. Care instructions adapted under license by [facility] ([facility]).  If you have questions about a medical condition or this instruction, always ask your healthcare professional. [address] any warranty or liability for your use of this information.

## 2021-12-02 LAB
DIRECT EXAM: NORMAL
Lab: NORMAL
SPECIMEN DESCRIPTION: NORMAL

## 2021-12-02 NOTE — RESULT ENCOUNTER NOTE
Please notify patient that their lab results are normal. No bv or yeast. Follow up with dermatology.

## 2022-01-28 ENCOUNTER — HOSPITAL ENCOUNTER (EMERGENCY)
Facility: CLINIC | Age: 42
Discharge: HOME | End: 2022-01-28
Attending: EMERGENCY MEDICINE
Payer: COMMERCIAL

## 2022-01-28 VITALS
TEMPERATURE: 97.9 F | OXYGEN SATURATION: 98 % | WEIGHT: 123 LBS | SYSTOLIC BLOOD PRESSURE: 119 MMHG | BODY MASS INDEX: 21.79 KG/M2 | HEART RATE: 77 BPM | DIASTOLIC BLOOD PRESSURE: 76 MMHG | RESPIRATION RATE: 15 BRPM

## 2022-01-28 DIAGNOSIS — L30.9 DERMATITIS: Primary | ICD-10-CM

## 2022-01-28 PROCEDURE — 99282 EMERGENCY DEPT VISIT SF MDM: CPT

## 2022-01-28 PROCEDURE — 6360000002 HC RX W HCPCS: Performed by: REGISTERED NURSE

## 2022-01-28 PROCEDURE — 96372 THER/PROPH/DIAG INJ SC/IM: CPT

## 2022-01-28 RX ORDER — DEXAMETHASONE SODIUM PHOSPHATE 10 MG/ML
8 INJECTION, SOLUTION INTRAMUSCULAR; INTRAVENOUS ONCE
Status: COMPLETED | OUTPATIENT
Start: 2022-01-28 | End: 2022-01-28

## 2022-01-28 RX ORDER — PREDNISONE 20 MG/1
40 TABLET ORAL DAILY
Qty: 10 TABLET | Refills: 0 | Status: SHIPPED | OUTPATIENT
Start: 2022-01-28 | End: 2022-02-02

## 2022-01-28 RX ORDER — PERMETHRIN 50 MG/G
CREAM TOPICAL
Qty: 60 G | Refills: 0 | Status: SHIPPED | OUTPATIENT
Start: 2022-01-28

## 2022-01-28 RX ADMIN — DEXAMETHASONE SODIUM PHOSPHATE 8 MG: 10 INJECTION INTRAMUSCULAR; INTRAVENOUS at 13:19

## 2022-01-28 ASSESSMENT — ENCOUNTER SYMPTOMS
RESPIRATORY NEGATIVE: 1
GASTROINTESTINAL NEGATIVE: 1
EYES NEGATIVE: 1

## 2022-01-28 ASSESSMENT — PAIN SCALES - GENERAL: PAINLEVEL_OUTOF10: 3

## 2022-01-28 NOTE — ED PROVIDER NOTES
[facility]  [facility]  Phone: [phone]        Pt Name: Eboni Madera  MRN: [id number]  [place] 1980  Date of evaluation: 22    [address]       Chief Complaint   Patient presents with    Rash     Bilateral hands, legs, and feet rash. Pt took benadryl 2 hours ago which pt states helped. HISTORY OF PRESENT ILLNESS (Location/Symptom, Timing/Onset, Context/Setting, Quality, Duration, Modifying Factors, Severity)      Eboni Madera is a 39 y.o. female who presents to the ED via private auto with rash ongoing for the last 2 days have her start her hands and has migrated to her thighs. She denies any fevers or chills, chest pain, shortness of breath, abdominal pain, nausea vomiting or diarrhea. She states that about 2 weeks ago she did have Covid and was concerned that this could be lingering side effect from her Covid infection. States that she has been taking Benadryl that has helped decrease her rash and make the itching tolerable. PAST MEDICAL / SURGICAL / SOCIAL / FAMILY HISTORY     PMH:  has a past medical history of Arthritis, Atypical squamous cells of undetermined significance (ASCUS) on Papanicolaou smear of cervix, History of vaginal bleeding, Migraine headache, Osteoarthritis, Ovarian cyst, Psoriasis, Renal lithiasis, Tachycardia, Thyroid disease, and Varicosities. Surgical History:  has a past surgical history that includes Foot surgery; laparoscopy;  section, low transverse (09/15/2012); Cardiac surgery; Appendectomy; laparoscopy (2018); Cystoscopy (2018); Colposcopy (10/19/2018); Colposcopy (2019); Colposcopy (2020); and Kidney stone surgery (2020). Social History:  reports that she has been smoking cigarettes. She has been smoking about 0.50 packs per day. She has never used smokeless tobacco. She reports previous alcohol use. She reports current drug use. Drug: Opiates .   Family History: She indicated that her mother is alive. She indicated that her father is alive. She indicated that her brother is alive. She indicated that her maternal grandmother is . She indicated that her maternal grandfather is . She indicated that her paternal grandmother is . She indicated that her paternal grandfather is . She indicated that her maternal aunt is alive. She indicated that her paternal aunt is . She indicated that her other is alive. family history includes Cancer in her maternal aunt and another family member; Depression in her mother; Heart Disease in her father; High Cholesterol in her father; Hypertension in her father, mother, and paternal grandmother; Other in her father and maternal grandfather; Ovarian Cancer in her paternal aunt; Stroke in her father; Thyroid Disease in her mother. Psychiatric History: None    Allergies: Quetiapine, Cleocin [clindamycin hcl], and Metronidazole    Home Medications:   Prior to Admission medications    Medication Sig Start Date End Date Taking? Authorizing Provider   predniSONE (DELTASONE) 20 MG tablet Take 2 tablets by mouth daily for 5 days 22 Yes KRYSTYNA Lutz CNP   permethrin (ELIMITE) 5 % cream White entire body, wash off after 8 to 14 hours, then repeat in 1 week. 22  Yes KRYSTYNA Lutz CNP   Liothyronine Sodium (CYTOMEL PO) Take by mouth    Historical Provider, MD   nystatin (MYCOSTATIN) 345595 UNIT/GM powder Apply 3 times daily.  21   Laura Luna, DO   Certolizumab Pegol (CIMZIA SC) Inject into the skin    Historical Provider, MD   ibuprofen (ADVIL;MOTRIN) 600 MG tablet Take 600 mg by mouth every 6 hours as needed for Pain    Historical Provider, MD   ondansetron (ZOFRAN-ODT) 4 MG disintegrating tablet Take 1 tablet by mouth every 8 hours as needed for Nausea or Vomiting 19   Hugo Castaneda MD   Magnesium Gluconate 550 MG TABS Take 30 mg by mouth    Historical Provider, MD   Taurine 1000 MG CAPS Take by mouth    Historical Provider, MD   ALPRAZolam (XANAX) 1 MG tablet TK 1 AND 1/2 TO 2 TS PO QID 12/23/17   Historical Provider, MD   NONFORMULARY RUKHSANA 750mg    Historical Provider, MD   Cholecalciferol (VITAMIN D3) 25866 UNITS CAPS Take 1 capsule by mouth once a week    Historical Provider, MD   LORazepam (ATIVAN) 2 MG tablet Take 2 mg by mouth nightly    Historical Provider, MD       REVIEW OF SYSTEMS  (2-9 systems for level 4, 10 ormore for level 5)      Review of Systems   Constitutional: Negative. HENT: Negative. Eyes: Negative. Respiratory: Negative. Cardiovascular: Negative. Gastrointestinal: Negative. Endocrine: Negative. Genitourinary: Negative. Musculoskeletal: Negative. Skin: Positive for rash. Neurological: Negative. All other systems negative except as marked. PHYSICAL EXAM  (up to 7 for level 4, 8 or more for level 5)      INITIAL VITALS:  weight is 55.8 kg (123 lb). Her temperature is 97.9 °F (36.6 °C). Her blood pressure is 119/76 and her pulse is 77. Her respiration is 15 and oxygen saturation is 98%. Vital signs reviewed. Physical Exam  Constitutional:       General: She is not in acute distress. Appearance: Normal appearance. She is not toxic-appearing. HENT:      Head: Normocephalic and atraumatic. Nose: Nose normal.      Mouth/Throat:      Mouth: Mucous membranes are moist.      Pharynx: Oropharynx is clear. No oropharyngeal exudate or posterior oropharyngeal erythema. Eyes:      Extraocular Movements: Extraocular movements intact. Conjunctiva/sclera: Conjunctivae normal.      Pupils: Pupils are equal, round, and reactive to light. Musculoskeletal:         General: Normal range of motion. Cervical back: Neck supple. No rigidity. Lymphadenopathy:      Cervical: No cervical adenopathy. Skin:     General: Skin is warm and dry.       Capillary Refill: Capillary refill takes less than 2 seconds. Findings: Rash present. Rash is macular and papular. Neurological:      General: No focal deficit present. Mental Status: She is alert. Psychiatric:         Mood and Affect: Mood normal.         Behavior: Behavior normal.           DIFFERENTIAL DIAGNOSIS / MDM     After my physical exam, I do believe the patient possibly has 2 different rashes occurring. Do believe the patient could have rash associated to her fingers from possible scabies and with the patient's rash to the sole of her foot I do believe it is likely viral in nature. Plan discharge patient home to follow-up with her PCP within 1 day. I will provide the patient with prednisone for inflammatory response along with permethrin cream recovery of any scabies or mite infection. I instructed patient she can take Benadryl at home for itching. Also provide the patient with IM Decadron per her request for flow to response. Directed patient return the ER with any worsening rash, fever chills, chest pain, shortness of breath, abdominal pain, nausea vomiting diarrhea, difficulty breathing, or swelling to her lips or mouth. Patient is agreed with this plan at this time. All question concerns answered at this time. The patient understands that at this time there is no evidence for a more malignant underlying process, but the patient also understands that early in the process of an illness or injury, an emergency department workup can be falsely reassuring. Routine discharge counseling was given, and the patient understands that worsening, changing or persistent symptoms should prompt an immediate call or follow up with their primary physician or return to the emergency department. The importance of appropriate follow up was also discussed. I have reviewed the disposition diagnosis with the patient and or their family/guardian. I have answered their questions and given discharge instructions.   They voiced understanding of these instructions and did not have any further questions or complaints. PLAN (LABS / IMAGING / EKG):  No orders of the defined types were placed in this encounter. MEDICATIONS ORDERED:  Orders Placed This Encounter   Medications    dexamethasone (PF) (DECADRON) injection 8 mg    predniSONE (DELTASONE) 20 MG tablet     Sig: Take 2 tablets by mouth daily for 5 days     Dispense:  10 tablet     Refill:  0    permethrin (ELIMITE) 5 % cream     Sig: White entire body, wash off after 8 to 14 hours, then repeat in 1 week. Dispense:  60 g     Refill:  0       Controlled Substances Monitoring:     DIAGNOSTIC RESULTS     EKG: All EKG's are interpreted by the Emergency Department Physician who either signs or Co-signs this chart in the absenceof a cardiologist.        RADIOLOGY: All images are read by the radiologist and their interpretations are reviewed. No orders to display       No results found. LABS:  No results found for this visit on 01/28/22. EMERGENCY DEPARTMENT COURSE           Vitals:    Vitals:    01/28/22 1252   BP: 119/76   Pulse: 77   Resp: 15   Temp: 97.9 °F (36.6 °C)   SpO2: 98%   Weight: 55.8 kg (123 lb)     -------------------------  BP: 119/76, Temp: 97.9 °F (36.6 °C), Pulse: 77, Resp: 15      RE-EVALUATION:  See ED Course notes above. CONSULTS:  None    PROCEDURES:  None    FINAL IMPRESSION      1. Dermatitis          DISPOSITION / PLAN     CONDITION ON DISPOSITION:   Stable for discharge.      PATIENT REFERRED TO:  [address] [place] [address] [phone] [facility]  [phone]    Call in 1 day      [facility]  [address]   [phone]    If symptoms worsen      DISCHARGE MEDICATIONS:  Discharge Medication List as of 1/28/2022  1:21 PM      START taking these medications    Details   predniSONE (DELTASONE) 20 MG tablet Take 2 tablets by mouth daily for 5 days, Disp-10 tablet, R-0Normal      permethrin (ELIMITE) 5 % cream White entire body, wash off after 8 to 14 hours, then repeat in 1 week., Disp-60 g, R-0, Normal             KRYSTYNA Montanez CNP   Emergency Medicine Nurse Practitioner    (Please note that portions of this note were completed with a voice recognition program.  Efforts were made to edit the dictations but occasionally words aremis-transcribed.)       KRYSTYNA Montanez CNP  01/28/22 4660

## 2022-02-07 NOTE — ED PROVIDER NOTES
Attending Supervisory Note/Shared Visit   I have personally performed a face to face diagnostic evaluation on this patient. I have reviewed the mid-levels findings and agree.   History and Exam by me shows an alert and oriented patient seen with extender I agree with the assessment treatment plan and disposition      (Please note that portions of this note were completed with a voice recognition program.  Efforts were made to edit the dictations but occasionally words are mis-transcribed.)    Osmel Kimbrough MD  Attending Emergency Physician      Osmel Kimbrough MD  02/07/22 4947

## 2022-02-24 DIAGNOSIS — N95.1 MENOPAUSAL SYMPTOMS: ICD-10-CM

## 2023-02-14 DIAGNOSIS — R39.198 DIFFICULTY URINATING: ICD-10-CM

## 2023-02-14 DIAGNOSIS — R39.89 URETHRAL PAIN: Primary | ICD-10-CM

## 2023-02-15 ENCOUNTER — HOSPITAL ENCOUNTER (OUTPATIENT)
Age: 43
Setting detail: SPECIMEN
Discharge: HOME | End: 2023-02-15

## 2023-02-15 DIAGNOSIS — R39.89 URETHRAL PAIN: ICD-10-CM

## 2023-02-15 DIAGNOSIS — R39.198 DIFFICULTY URINATING: ICD-10-CM

## 2023-02-15 LAB
BILIRUBIN URINE: NEGATIVE
CASTS UA: NORMAL /LPF (ref 0–8)
COLOR: YELLOW
EPITHELIAL CELLS UA: NORMAL /HPF (ref 0–5)
GLUCOSE UR STRIP.AUTO-MCNC: NEGATIVE MG/DL
KETONES UR STRIP.AUTO-MCNC: NEGATIVE MG/DL
LEUKOCYTE ESTERASE UR QL STRIP.AUTO: NEGATIVE
NITRITE UR QL STRIP.AUTO: NEGATIVE
PROT UR STRIP.AUTO-MCNC: 7.5 MG/DL (ref 5–8)
PROT UR STRIP.AUTO-MCNC: NEGATIVE MG/DL
RBC CLUMPS #/AREA URNS AUTO: NORMAL /HPF (ref 0–4)
SPECIFIC GRAVITY UA: 1.01 (ref 1–1.03)
TURBIDITY: ABNORMAL
URINE HGB: NEGATIVE
UROBILINOGEN, URINE: NORMAL
WBC UA: NORMAL /HPF (ref 0–5)

## 2023-02-16 LAB
MICROORGANISM SPEC CULT: NO GROWTH
SPECIMEN DESCRIPTION: NORMAL

## 2023-02-23 ENCOUNTER — OFFICE VISIT (OUTPATIENT)
Dept: OBGYN CLINIC | Age: 43
End: 2023-02-23
Payer: COMMERCIAL

## 2023-02-23 ENCOUNTER — HOSPITAL ENCOUNTER (OUTPATIENT)
Age: 43
Setting detail: SPECIMEN
Discharge: HOME | End: 2023-02-23

## 2023-02-23 VITALS
SYSTOLIC BLOOD PRESSURE: 117 MMHG | HEART RATE: 96 BPM | BODY MASS INDEX: 17.54 KG/M2 | DIASTOLIC BLOOD PRESSURE: 75 MMHG | WEIGHT: 99 LBS

## 2023-02-23 DIAGNOSIS — N90.89 VULVAR IRRITATION: Primary | ICD-10-CM

## 2023-02-23 DIAGNOSIS — N89.8 VAGINAL DISCHARGE: ICD-10-CM

## 2023-02-23 PROCEDURE — 99213 OFFICE O/P EST LOW 20 MIN: CPT | Performed by: STUDENT IN AN ORGANIZED HEALTH CARE EDUCATION/TRAINING PROGRAM

## 2023-02-23 RX ORDER — CLOBETASOL PROPIONATE 0.5 MG/G
CREAM TOPICAL
Qty: 60 G | Refills: 1 | Status: CANCELLED | OUTPATIENT
Start: 2023-02-23

## 2023-02-23 NOTE — PROGRESS NOTES
[address] [facility]  [address] [facility], [address] [place] [address]      Problem Visit      Radha Baig  2023                       Primary Care Physician: Walker Moran    CC:   Chief Complaint   Patient presents with    Vaginal Discharge         HPI: Radha Baig is a 43 y.o. female  No LMP recorded. Patient has had an ablation. The patient was seen and examined. She is here for vaginal discharge and is complaining of irritation. Patient has a history of psoriasis and thinks she may have a psoriasis flareup on her vulva. She is also complaining of increased vaginal discharge.     REVIEW OF SYSTEMS:  Constitutional: negative fever, negative chills  HEENT: negative visual disturbances, negative headaches  Respiratory: negative dyspnea, negative cough  Cardiovascular: negative chest pain,  negative palpitations  Gastrointestinal: negative abdominal pain, negative RUQ pain, negative N/V, negative diarrhea, negative constipation  Genitourinary: negative dysuria, positive vaginal discharge/irritation  Dermatological: negative rash  Hematologic: negative bruising  Immunologic/Lymphatic: negative recent illness, negative recent sick contact  Musculoskeletal: negative back pain, negative myalgias, negative arthralgias  Neurological:  negative dizziness, negative weakness  Behavior/Psych: negative depression, negative anxiety    OBSTETRICAL HISTORY:  OB History    Para Term  AB Living   2 2 2 0 0 2   SAB IAB Ectopic Molar Multiple Live Births   0 0 0   0 2      # Outcome Date GA Lbr Pieter/2nd Weight Sex Delivery Anes PTL Lv   2 Term 14 37w0d  3 lb 2 oz (1.417 kg) M CS-LTranv   SURJIT   1 Term 09/15/12 41w4d  7 lb 1 oz (3.204 kg) M CS-LTranv   SURJIT      Birth Comments: circ 12 Dr Jozef Souza HISTORY:      Diagnosis Date    Arthritis     Atypical squamous cells of undetermined significance (ASCUS) on Papanicolaou smear of cervix 2018    HPV+    History of vaginal bleeding     constant bled for 5 yrs cause unknown    Migraine headache 7/10/2012    Osteoarthritis     with rheamuatoid arthritis    Ovarian cyst     Psoriasis     Renal lithiasis     Hx of kidney stones    Tachycardia     acute tachycardia. Had 2 ablations    Thyroid disease     treated as teen. No problem since then    Varicosities        PAST SURGICAL HISTORY:                                                                    Procedure Laterality Date    APPENDECTOMY      CARDIAC SURGERY      x 2 for tacycardia resting heart rate now      SECTION, LOW TRANSVERSE  09/15/2012    COLPOSCOPY  10/19/2018    1oclock & 6oclock LSIL    COLPOSCOPY  2019    COLPOSCOPY  2020    Bx NINO-I    CYSTOSCOPY  2018    FOOT SURGERY      for osteoarthritis metal plates in both feet    KIDNEY STONE SURGERY  2020    LAPAROSCOPY      ? endometriosis for constant vaginal bleeding    LAPAROSCOPY  2018    crystal partial salpingectomy, NovaSure ablation, hysteroscopy       MEDICATIONS:  Current Outpatient Medications   Medication Sig Dispense Refill    Secukinumab (COSENTYX SC) Inject into the skin      permethrin (ELIMITE) 5 % cream White entire body, wash off after 8 to 14 hours, then repeat in 1 week. 60 g 0    Liothyronine Sodium (CYTOMEL PO) Take by mouth      nystatin (MYCOSTATIN) 551610 UNIT/GM powder Apply 3 times daily.  60 g 1    Certolizumab Pegol (CIMZIA SC) Inject into the skin      ibuprofen (ADVIL;MOTRIN) 600 MG tablet Take 600 mg by mouth every 6 hours as needed for Pain      ondansetron (ZOFRAN-ODT) 4 MG disintegrating tablet Take 1 tablet by mouth every 8 hours as needed for Nausea or Vomiting 6 tablet 2    Magnesium Gluconate 550 MG TABS Take 30 mg by mouth      Taurine 1000 MG CAPS Take by mouth      ALPRAZolam (XANAX) 1 MG tablet TK 1 AND 1/2 TO 2 TS PO QID  0    NONFORMULARY RUKHSANA 750mg      Cholecalciferol (VITAMIN D3) 86438 UNITS CAPS Take 1 capsule by mouth once a week      LORazepam (ATIVAN) 2 MG tablet Take 2 mg by mouth nightly       No current facility-administered medications for this visit. ALLERGIES:   Allergies as of 2023 - Fully Reviewed 2023   Allergen Reaction Noted    Quetiapine Other (See Comments) 2020    Cleocin [clindamycin hcl] Nausea And Vomiting 2019    Metronidazole Hives 2018                                   VITALS:  Vitals:    23 1009   BP: 117/75   Site: Left Upper Arm   Position: Sitting   Cuff Size: Medium Adult   Pulse: 96   Weight: 99 lb (44.9 kg)                                                                                                                                                                    PHYSICAL EXAM:   General Appearance: Appears healthy. Alert; in no acute distress. Pleasant. Skin: Skin color, texture, turgor normal. No rashes or lesions. HEENT: normocephalic and atraumatic, Thyroid normal to inspection and palpation  Respiratory: Normal expansion. Clear to auscultation. No rales, rhonchi, or wheezing. Cardiovascular: normal rate, normal S1 and S2, no gallops, intact distal pulses and no carotid bruits  Breast:  (Chest): N/A  Abdomen: soft, non-tender, non-distended, no right upper quadrant tenderness and no CVA tenderness  Pelvic Exam:   External genitalia: General appearance; normal, Hair distribution; normal, Lesions absent, no evidence of psoriatic plaques  Urinary system: urethral meatus normal  Vaginal: normal mucosa, minimal discahrge  Rectal Exam: exam declined by patient  Musculoskeletal: no gross abnormalities  Extremities: non-tender BLE and non-edematous  Psych:  oriented to time, place and person       DATA:  No results found for this visit on 23. ASSESSMENT & PLAN:    Ni Maya is a 43 y.o. female  No LMP recorded. Patient has had an ablation.     Vulvar Irritaiton   - Can use psoriatic ointment on vulva to help with irritation    Vaginal Discharge   - Vag probe done by blind swab. Will treat based on results. Patient Active Problem List    Diagnosis Date Noted    Psoriasis      Priority: High    Migraine headache 07/10/2012     Priority: Low     Takes Vicodin PRN      Osteoarthritis      Priority: Low    Vaginal Pap smear with LGSIL 09/28/2020    Elevated blood pressure reading 11/12/2014     Labetalol 200mg BID  Updating deleted diagnoses         Return in about 3 months (around 5/23/2023) for Annual.    Counseling Completed:    Discussed need for repeat pap as per American Society for Colposcopy and Cervical Pathology guidelines. Discussed need for mammograms every 1 year, If >42 yo and last mammogram was negative. Discussed Calcium and Vitamin D dosing. Discussed need for colonoscopy screening as well as onset for bone density testing. Discussed birth control and barrier recommendations. Discussed STD counseling and prevention. Discussed Gardisil counseling for all patients 10-35 yo. Hereditary Breast, Ovarian, Colon and Uterine Cancer screening discussed. Tobacco & Secondary smoke risks discussed; with recommendation for cessation and avoidance. Routine health maintenance per patients PCP discussed. Patient was seen with total face to face time of 15 minutes. More than 50% of this visit was on counseling and education regarding her diagnose(s) as listed below and options. She was also counseled on her preventative health maintenance recommendations and follow-up. Diagnosis Orders   1. Vulvar irritation        2.  Vaginal discharge  Vaginitis DNA Probe            DO [facility] Sal[facility]   2/23/2023, 5:15 PM

## 2023-02-24 LAB
CANDIDA SPECIES, DNA PROBE: NEGATIVE
GARDNERELLA VAGINALIS, DNA PROBE: NEGATIVE
SOURCE: NORMAL
TRICHOMONAS VAGINALIS DNA: NEGATIVE

## 2023-02-27 ENCOUNTER — TELEPHONE (OUTPATIENT)
Dept: OBGYN CLINIC | Age: 43
End: 2023-02-27

## 2023-03-23 ENCOUNTER — HOSPITAL ENCOUNTER (EMERGENCY)
Facility: CLINIC | Age: 43
Discharge: HOME | End: 2023-03-23
Attending: EMERGENCY MEDICINE
Payer: COMMERCIAL

## 2023-03-23 VITALS
DIASTOLIC BLOOD PRESSURE: 78 MMHG | RESPIRATION RATE: 16 BRPM | WEIGHT: 98 LBS | HEIGHT: 63 IN | TEMPERATURE: 97.9 F | HEART RATE: 108 BPM | OXYGEN SATURATION: 97 % | BODY MASS INDEX: 17.36 KG/M2 | SYSTOLIC BLOOD PRESSURE: 128 MMHG

## 2023-03-23 DIAGNOSIS — H00.012 HORDEOLUM EXTERNUM OF RIGHT LOWER EYELID: Primary | ICD-10-CM

## 2023-03-23 PROCEDURE — 99283 EMERGENCY DEPT VISIT LOW MDM: CPT

## 2023-03-23 PROCEDURE — 6370000000 HC RX 637 (ALT 250 FOR IP): Performed by: EMERGENCY MEDICINE

## 2023-03-23 RX ORDER — ERYTHROMYCIN 5 MG/G
OINTMENT OPHTHALMIC EVERY 8 HOURS SCHEDULED
Status: DISCONTINUED | OUTPATIENT
Start: 2023-03-23 | End: 2023-03-23 | Stop reason: HOSPADM

## 2023-03-23 RX ADMIN — ERYTHROMYCIN: 5 OINTMENT OPHTHALMIC at 16:46

## 2023-03-23 ASSESSMENT — PAIN DESCRIPTION - LOCATION: LOCATION: EYE

## 2023-03-23 ASSESSMENT — PAIN DESCRIPTION - ORIENTATION: ORIENTATION: LEFT

## 2023-03-23 ASSESSMENT — PAIN - FUNCTIONAL ASSESSMENT: PAIN_FUNCTIONAL_ASSESSMENT: 0-10

## 2023-03-23 ASSESSMENT — PAIN SCALES - GENERAL: PAINLEVEL_OUTOF10: 5

## 2023-03-23 NOTE — ED PROVIDER NOTES
.5 packs per day. She has never used smokeless tobacco. She reports that she does not currently use alcohol. She reports current drug use. Drug: Opiates . PHYSICAL EXAM       ED Triage Vitals [03/23/23 1614]   BP Temp Temp Source Heart Rate Resp SpO2 Height Weight   128/78 97.9 °F (36.6 °C) Oral (!) 108 16 97 % 5' 3\" (1.6 m) 98 lb (44.5 kg)   Constitutional: Alert, oriented x3, nontoxic, answering questions appropriately, acting properly for age, in no acute distress   HEENT: Extraocular muscles intact, mucous membranes moist. Pupils equal, round, reactive to light, lower lid has slight erythema but no pustular area is identified. Active are clear. TMs clear bilaterally, no posterior pharyngeal erythema or exudates. Neck: Trachea midline, supple without lymphadenopathy, no posterior midline neck tenderness to palpation   Musculoskeletal: No extremity pain or swelling   Neurologic: Moving all 4 extremities without difficulty   Skin: Warm and dry        DIFFERENTIAL DIAGNOSIS/ MDM:         DIAGNOSTIC RESULTS     EKG: All EKG's are interpreted by the Emergency Department Physician who either signs or Co-signs this chart in the absence of a cardiologist.        Not indicated unless otherwise documented above    LABS:  No results found for this visit on 03/23/23. Not indicated unless otherwise documented above    RADIOLOGY:   I reviewed the radiologist interpretations:    No orders to display       Not indicated unless otherwise documented above    EMERGENCY DEPARTMENT COURSE:     The patient was given the following medications:  Orders Placed This Encounter   Medications    erythromycin (ROMYCIN) ophthalmic ointment        Vitals:   -------------------------  /78   Pulse (!) 108   Temp 97.9 °F (36.6 °C) (Oral)   Resp 16   Ht 5' 3\" (1.6 m)   Wt 44.5 kg (98 lb)   SpO2 97%   BMI 17.36 kg/m²         I have reviewed the disposition diagnosis with the patient and or their family/guardian.  I have answered their questions and given discharge instructions. They voiced understanding of these instructions and did not have any furtherquestions or complaints. CRITICAL CARE:    None    CONSULTS:    None    PROCEDURES:    None      OARRS Report if indicated             FINAL IMPRESSION      1.  Hordeolum externum of right lower eyelid          DISPOSITION/PLAN   DISPOSITION Decision To Discharge 03/23/2023 04:20:56 PM        CONDITION ON DISPOSITION: STABLE       PATIENT REFERRED TO:  Carley Paul  [address] [facility] [id number] [phone]          DISCHARGE MEDICATIONS:  New Prescriptions    No medications on file       (Please note that portions of thisnote were completed with a voice recognition program.  Efforts were made to edit the dictations but occasionally words are mis-transcribed.)    Baldwin Leyden, MD,, MD  Attending Emergency Physician        Baldwin Leyden, MD  03/23/23 2203

## 2023-03-23 NOTE — ED NOTES
Pt presents to ED c/o right eye irritation. Pt states symptoms have been present for the past few days. Right lower eyelid appears red/irritated. Pt arrives A/Ox4, PWD, PMS intact. Pt resting on stretcher with call light in reach.      Ravi Mayberry RN  03/23/23 6169

## 2023-12-06 ENCOUNTER — HOSPITAL ENCOUNTER (OUTPATIENT)
Age: 43
Setting detail: SPECIMEN
Discharge: HOME | End: 2023-12-06

## 2023-12-06 ENCOUNTER — OFFICE VISIT (OUTPATIENT)
Dept: OBGYN CLINIC | Age: 43
End: 2023-12-06
Payer: MEDICAID

## 2023-12-06 VITALS
HEART RATE: 101 BPM | DIASTOLIC BLOOD PRESSURE: 88 MMHG | WEIGHT: 107.4 LBS | SYSTOLIC BLOOD PRESSURE: 132 MMHG | BODY MASS INDEX: 19.03 KG/M2

## 2023-12-06 DIAGNOSIS — Z01.419 WELL WOMAN EXAM WITH ROUTINE GYNECOLOGICAL EXAM: Primary | ICD-10-CM

## 2023-12-06 DIAGNOSIS — Z80.41 FAMILY HISTORY OF OVARIAN CANCER: ICD-10-CM

## 2023-12-06 DIAGNOSIS — N89.8 VAGINAL DRYNESS: ICD-10-CM

## 2023-12-06 DIAGNOSIS — N89.8 VAGINAL DISCHARGE: ICD-10-CM

## 2023-12-06 DIAGNOSIS — Z12.31 SCREENING MAMMOGRAM FOR BREAST CANCER: ICD-10-CM

## 2023-12-06 PROCEDURE — G8484 FLU IMMUNIZE NO ADMIN: HCPCS | Performed by: STUDENT IN AN ORGANIZED HEALTH CARE EDUCATION/TRAINING PROGRAM

## 2023-12-06 PROCEDURE — 99396 PREV VISIT EST AGE 40-64: CPT | Performed by: STUDENT IN AN ORGANIZED HEALTH CARE EDUCATION/TRAINING PROGRAM

## 2023-12-06 RX ORDER — ESTRADIOL 0.1 MG/G
1 CREAM VAGINAL DAILY
Qty: 42.5 G | Refills: 3 | Status: SHIPPED | OUTPATIENT
Start: 2023-12-06

## 2023-12-06 NOTE — PROGRESS NOTES
OB/GYN Annual Visit  [facility]  2023                       Primary Care Physician: Jessica Landeros    CC:   Chief Complaint   Patient presents with    Annual Exam         HPI: Anabel Snowden is a 37 y.o. female  here for an annual visit. She is complaining of some vaginal dryness, pain with masturbation, and a small bump on her labia. The patient reports going through a divorce which has been very stressful for her. She has not been sexually active in 3 years and reports that she has started trying to masturbate but has concerns about dryness, difficulty achieving orgasm, and pain after using a vibrator. In depth discussion regarding gyn anatomy and addressed her areas of question/concern. The patient reports a small bump which has been present for a few months that is not tender and doesn't have any exudate. She is concerned for a genital wart. No LMP recorded. Patient has had an ablation. She has very occasional cramping and bleeding after her ablation. She does report at times the cramping is severe. Discussed that next step for treatment would be hysterectomy which patient does not have any interest in. Her bowel habits are regular. She denies any bloating. She denies dysuria. She denies urinary leaking. She complains of vaginal discharge. She is not currently sexually active.     Depression Screen: Symptoms of decreased mood absent  Symptoms of anhedonia absent  **If either question is answered in a  positive fashion then complete the PHQ9 Scoring Evaluation and make the appropriate referral**    REVIEW OF SYSTEMS:   Constitutional: negative fever, negative chills, negative weight changes   HEENT: negative visual disturbances, negative headaches, negative dizziness, negative hearing loss  Breast: Negative breast abnormalities, negative breast lumps, negative nipple discharge  Respiratory: negative dyspnea, negative cough, negative

## 2023-12-07 ENCOUNTER — APPOINTMENT (OUTPATIENT)
Dept: GENERAL RADIOLOGY | Facility: CLINIC | Age: 43
End: 2023-12-07
Payer: MEDICAID

## 2023-12-07 ENCOUNTER — HOSPITAL ENCOUNTER (EMERGENCY)
Facility: CLINIC | Age: 43
Discharge: HOME | End: 2023-12-07
Attending: EMERGENCY MEDICINE
Payer: MEDICAID

## 2023-12-07 VITALS
HEART RATE: 109 BPM | RESPIRATION RATE: 17 BRPM | OXYGEN SATURATION: 96 % | DIASTOLIC BLOOD PRESSURE: 89 MMHG | SYSTOLIC BLOOD PRESSURE: 144 MMHG | TEMPERATURE: 99 F

## 2023-12-07 DIAGNOSIS — J02.9 ACUTE SORE THROAT: Primary | ICD-10-CM

## 2023-12-07 DIAGNOSIS — J02.0 STREP PHARYNGITIS: ICD-10-CM

## 2023-12-07 LAB
CANDIDA SPECIES: NEGATIVE
FLUAV AG SPEC QL: NEGATIVE
FLUBV AG SPEC QL: NEGATIVE
GARDNERELLA VAGINALIS: NEGATIVE
SARS-COV-2 RDRP RESP QL NAA+PROBE: NOT DETECTED
SOURCE: NORMAL
SPECIMEN DESCRIPTION: NORMAL
SPECIMEN SOURCE: ABNORMAL
STREP A, MOLECULAR: POSITIVE
TRICHOMONAS: NEGATIVE

## 2023-12-07 PROCEDURE — 87651 STREP A DNA AMP PROBE: CPT

## 2023-12-07 PROCEDURE — 99284 EMERGENCY DEPT VISIT MOD MDM: CPT

## 2023-12-07 PROCEDURE — 87804 INFLUENZA ASSAY W/OPTIC: CPT

## 2023-12-07 PROCEDURE — 6360000002 HC RX W HCPCS: Performed by: EMERGENCY MEDICINE

## 2023-12-07 PROCEDURE — 96372 THER/PROPH/DIAG INJ SC/IM: CPT

## 2023-12-07 PROCEDURE — 6370000000 HC RX 637 (ALT 250 FOR IP): Performed by: EMERGENCY MEDICINE

## 2023-12-07 PROCEDURE — 87635 SARS-COV-2 COVID-19 AMP PRB: CPT

## 2023-12-07 PROCEDURE — 71045 X-RAY EXAM CHEST 1 VIEW: CPT

## 2023-12-07 RX ORDER — AMOXICILLIN 250 MG/1
500 CAPSULE ORAL ONCE
Status: COMPLETED | OUTPATIENT
Start: 2023-12-07 | End: 2023-12-07

## 2023-12-07 RX ORDER — IBUPROFEN 800 MG/1
800 TABLET ORAL EVERY 8 HOURS PRN
Qty: 10 TABLET | Refills: 0 | Status: SHIPPED | OUTPATIENT
Start: 2023-12-07

## 2023-12-07 RX ORDER — HYDROCODONE BITARTRATE AND ACETAMINOPHEN 5; 325 MG/1; MG/1
1 TABLET ORAL EVERY 6 HOURS PRN
Qty: 10 TABLET | Refills: 0 | Status: SHIPPED | OUTPATIENT
Start: 2023-12-07 | End: 2023-12-10

## 2023-12-07 RX ORDER — KETOROLAC TROMETHAMINE 30 MG/ML
30 INJECTION, SOLUTION INTRAMUSCULAR; INTRAVENOUS ONCE
Status: COMPLETED | OUTPATIENT
Start: 2023-12-07 | End: 2023-12-07

## 2023-12-07 RX ORDER — AMOXICILLIN 500 MG/1
500 CAPSULE ORAL 3 TIMES DAILY
Qty: 30 CAPSULE | Refills: 0 | Status: SHIPPED | OUTPATIENT
Start: 2023-12-07 | End: 2023-12-17

## 2023-12-07 RX ORDER — DEXAMETHASONE SODIUM PHOSPHATE 10 MG/ML
10 INJECTION, SOLUTION INTRAMUSCULAR; INTRAVENOUS ONCE
Status: COMPLETED | OUTPATIENT
Start: 2023-12-07 | End: 2023-12-07

## 2023-12-07 RX ADMIN — DEXAMETHASONE SODIUM PHOSPHATE 10 MG: 10 INJECTION, SOLUTION INTRAMUSCULAR; INTRAVENOUS at 15:02

## 2023-12-07 RX ADMIN — AMOXICILLIN 500 MG: 250 CAPSULE ORAL at 15:02

## 2023-12-07 RX ADMIN — KETOROLAC TROMETHAMINE 30 MG: 30 INJECTION, SOLUTION INTRAMUSCULAR at 15:02

## 2023-12-07 ASSESSMENT — ENCOUNTER SYMPTOMS
SORE THROAT: 1
RHINORRHEA: 1
COUGH: 1
TROUBLE SWALLOWING: 0

## 2023-12-07 ASSESSMENT — PAIN SCALES - GENERAL
PAINLEVEL_OUTOF10: 8
PAINLEVEL_OUTOF10: 5

## 2023-12-07 NOTE — ED PROVIDER NOTES
suggestive peritonsillar abscess, retropharyngeal abscess, epiglottitis or any significant infection of the head and neck or throat. After she stable discharge patient is agreeable. No questions or concerns prior to  discharged    At this time the patient is without objective evidence of an acute process requiring hospitalization or inpatient management. They have remained hemodynamically stable throughout their entire ED visit and are stable for discharge with outpatient follow-up. The patient understands that at this time there is no evidence for a more malignant underlying process, but the patient also understands that early in the process of an illness or injury, an emergency department workup can be falsely reassuring. Routine discharge counseling was given, and the patient understands that worsening, changing or persistent symptoms should prompt an immediate call or follow up with their primary physician or return to the emergency department. The importance of appropriate follow up was also discussed. I have reviewed the disposition diagnosis with the patient and or their family/guardian. I have answered their questions and given discharge instructions. They voiced understanding of these instructions and did not have any further questions or complaints. DIAGNOSTIC RESULTS     EKG: All EKG's are interpreted by the Emergency Department Physician who either signs or Co-signs this chart in the [address] a cardiologist.        RADIOLOGY:  Non-plain film images such as CT, Ultrasound and MRI are read by the radiologist. [facility] radiographic images are visualized and the radiologist interpretations are reviewed as follows:         Interpretation per the Radiologist below, if available at the time of this note:        LABS:  Results for orders placed or performed during the hospital encounter of 12/07/23   Rapid Strep Screen    Specimen: Throat   Result Value Ref Range    Source . THROAT SWAB     Strep A,

## 2023-12-08 LAB
HPV I/H RISK 4 DNA CVX QL NAA+PROBE: DETECTED
HPV SAMPLE: ABNORMAL
HPV, INTERPRETATION: ABNORMAL
HPV16 DNA CVX QL NAA+PROBE: NOT DETECTED
HPV18 DNA CVX QL NAA+PROBE: NOT DETECTED
SPECIMEN DESCRIPTION: ABNORMAL

## 2023-12-11 PROBLEM — R87.612 LGSIL ON PAP SMEAR OF CERVIX: Status: ACTIVE | Noted: 2020-09-28

## 2023-12-11 LAB — CYTOLOGY REPORT: NORMAL

## 2024-06-06 ENCOUNTER — APPOINTMENT (OUTPATIENT)
Dept: CT IMAGING | Facility: CLINIC | Age: 44
End: 2024-06-06
Payer: MEDICAID

## 2024-06-06 ENCOUNTER — HOSPITAL ENCOUNTER (EMERGENCY)
Facility: CLINIC | Age: 44
Discharge: HOME | End: 2024-06-07
Attending: EMERGENCY MEDICINE
Payer: MEDICAID

## 2024-06-06 ENCOUNTER — APPOINTMENT (OUTPATIENT)
Dept: GENERAL RADIOLOGY | Facility: CLINIC | Age: 44
End: 2024-06-06
Payer: MEDICAID

## 2024-06-06 VITALS
TEMPERATURE: 98.8 F | DIASTOLIC BLOOD PRESSURE: 71 MMHG | HEART RATE: 101 BPM | SYSTOLIC BLOOD PRESSURE: 104 MMHG | OXYGEN SATURATION: 100 % | WEIGHT: 130 LBS | RESPIRATION RATE: 15 BRPM | BODY MASS INDEX: 23.03 KG/M2

## 2024-06-06 DIAGNOSIS — R50.81 NEUTROPENIA WITH FEVER (HCC): Primary | ICD-10-CM

## 2024-06-06 DIAGNOSIS — D70.9 NEUTROPENIA WITH FEVER (HCC): Primary | ICD-10-CM

## 2024-06-06 LAB
ALBUMIN SERPL-MCNC: 3.5 G/DL (ref 3.5–5.2)
ALBUMIN/GLOB SERPL: 1 {RATIO} (ref 1–2.5)
ALP SERPL-CCNC: 142 U/L (ref 35–104)
ALT SERPL-CCNC: 61 U/L (ref 5–33)
ANION GAP SERPL CALCULATED.3IONS-SCNC: 10 MMOL/L (ref 9–17)
AST SERPL-CCNC: 95 U/L
BACTERIA URNS QL MICRO: ABNORMAL
BASOPHILS # BLD: 0 K/UL (ref 0–0.2)
BASOPHILS NFR BLD: 1 % (ref 0–2)
BILIRUB SERPL-MCNC: 0.5 MG/DL (ref 0.3–1.2)
BILIRUB UR QL STRIP: NEGATIVE
BUN SERPL-MCNC: 11 MG/DL (ref 6–20)
CALCIUM SERPL-MCNC: 8.6 MG/DL (ref 8.6–10.4)
CHARACTER UR: ABNORMAL
CHLORIDE SERPL-SCNC: 100 MMOL/L (ref 98–107)
CLARITY UR: CLEAR
CO2 SERPL-SCNC: 25 MMOL/L (ref 20–31)
COLOR UR: YELLOW
CREAT SERPL-MCNC: 0.6 MG/DL (ref 0.5–0.9)
EOSINOPHIL # BLD: 0 K/UL (ref 0–0.4)
EOSINOPHILS RELATIVE PERCENT: 0 % (ref 1–4)
EPI CELLS #/AREA URNS HPF: ABNORMAL /HPF (ref 0–5)
ERYTHROCYTE [DISTWIDTH] IN BLOOD BY AUTOMATED COUNT: 13.5 % (ref 12.5–15.4)
FLUAV AG SPEC QL: NEGATIVE
FLUBV AG SPEC QL: NEGATIVE
GFR, ESTIMATED: >90 ML/MIN/1.73M2
GLUCOSE SERPL-MCNC: 85 MG/DL (ref 70–99)
GLUCOSE UR STRIP-MCNC: NEGATIVE MG/DL
HCG SERPL QL: NEGATIVE
HCT VFR BLD AUTO: 31.6 % (ref 36–46)
HGB BLD-MCNC: 11 G/DL (ref 12–16)
HGB UR QL STRIP.AUTO: NEGATIVE
KETONES UR STRIP-MCNC: NEGATIVE MG/DL
LACTATE BLDV-SCNC: 1 MMOL/L (ref 0.5–2.2)
LEUKOCYTE ESTERASE UR QL STRIP: NEGATIVE
LIPASE SERPL-CCNC: 31 U/L (ref 13–60)
LYMPHOCYTES NFR BLD: 0.7 K/UL (ref 1–4.8)
LYMPHOCYTES RELATIVE PERCENT: 43 % (ref 24–44)
MCH RBC QN AUTO: 30.8 PG (ref 26–34)
MCHC RBC AUTO-ENTMCNC: 34.7 G/DL (ref 31–37)
MCV RBC AUTO: 88.9 FL (ref 80–100)
MONOCYTES NFR BLD: 0.1 K/UL (ref 0.1–1.2)
MONOCYTES NFR BLD: 6 % (ref 2–11)
NEUTROPHILS NFR BLD: 50 % (ref 36–66)
NEUTS SEG NFR BLD: 0.8 K/UL (ref 1.8–7.7)
NITRITE UR QL STRIP: NEGATIVE
PH UR STRIP: 7 [PH] (ref 5–8)
PLATELET # BLD AUTO: 189 K/UL (ref 140–450)
PMV BLD AUTO: 7.5 FL (ref 6–12)
POTASSIUM SERPL-SCNC: 4 MMOL/L (ref 3.7–5.3)
PROT SERPL-MCNC: 7.1 G/DL (ref 6.4–8.3)
PROT UR STRIP-MCNC: ABNORMAL MG/DL
RBC # BLD AUTO: 3.56 M/UL (ref 4–5.2)
RBC #/AREA URNS HPF: ABNORMAL /HPF (ref 0–2)
SARS-COV-2 RDRP RESP QL NAA+PROBE: NOT DETECTED
SODIUM SERPL-SCNC: 135 MMOL/L (ref 135–144)
SP GR UR STRIP: 1.02 (ref 1–1.03)
SPECIMEN DESCRIPTION: NORMAL
SPECIMEN SOURCE: NORMAL
STREP A, MOLECULAR: NEGATIVE
TROPONIN I SERPL HS-MCNC: 6 NG/L (ref 0–14)
UROBILINOGEN UR STRIP-ACNC: NORMAL EU/DL (ref 0–1)
WBC #/AREA URNS HPF: ABNORMAL /HPF (ref 0–5)
WBC OTHER # BLD: 1.6 K/UL (ref 3.5–11)

## 2024-06-06 PROCEDURE — 84703 CHORIONIC GONADOTROPIN ASSAY: CPT

## 2024-06-06 PROCEDURE — 71045 X-RAY EXAM CHEST 1 VIEW: CPT

## 2024-06-06 PROCEDURE — 87804 INFLUENZA ASSAY W/OPTIC: CPT

## 2024-06-06 PROCEDURE — 81001 URINALYSIS AUTO W/SCOPE: CPT

## 2024-06-06 PROCEDURE — 96368 THER/DIAG CONCURRENT INF: CPT

## 2024-06-06 PROCEDURE — 85025 COMPLETE CBC W/AUTO DIFF WBC: CPT

## 2024-06-06 PROCEDURE — 36415 COLL VENOUS BLD VENIPUNCTURE: CPT

## 2024-06-06 PROCEDURE — 96365 THER/PROPH/DIAG IV INF INIT: CPT

## 2024-06-06 PROCEDURE — 99285 EMERGENCY DEPT VISIT HI MDM: CPT

## 2024-06-06 PROCEDURE — 93005 ELECTROCARDIOGRAM TRACING: CPT | Performed by: EMERGENCY MEDICINE

## 2024-06-06 PROCEDURE — 70450 CT HEAD/BRAIN W/O DYE: CPT

## 2024-06-06 PROCEDURE — 87651 STREP A DNA AMP PROBE: CPT

## 2024-06-06 PROCEDURE — 6370000000 HC RX 637 (ALT 250 FOR IP): Performed by: EMERGENCY MEDICINE

## 2024-06-06 PROCEDURE — 96366 THER/PROPH/DIAG IV INF ADDON: CPT

## 2024-06-06 PROCEDURE — 2580000003 HC RX 258: Performed by: EMERGENCY MEDICINE

## 2024-06-06 PROCEDURE — 84484 ASSAY OF TROPONIN QUANT: CPT

## 2024-06-06 PROCEDURE — 80053 COMPREHEN METABOLIC PANEL: CPT

## 2024-06-06 PROCEDURE — 83690 ASSAY OF LIPASE: CPT

## 2024-06-06 PROCEDURE — 6360000002 HC RX W HCPCS: Performed by: SPECIALIST

## 2024-06-06 PROCEDURE — 87040 BLOOD CULTURE FOR BACTERIA: CPT

## 2024-06-06 PROCEDURE — 2580000003 HC RX 258: Performed by: SPECIALIST

## 2024-06-06 PROCEDURE — 83605 ASSAY OF LACTIC ACID: CPT

## 2024-06-06 PROCEDURE — 87635 SARS-COV-2 COVID-19 AMP PRB: CPT

## 2024-06-06 PROCEDURE — 96375 TX/PRO/DX INJ NEW DRUG ADDON: CPT

## 2024-06-06 RX ORDER — 0.9 % SODIUM CHLORIDE 0.9 %
1000 INTRAVENOUS SOLUTION INTRAVENOUS ONCE
Status: COMPLETED | OUTPATIENT
Start: 2024-06-06 | End: 2024-06-06

## 2024-06-06 RX ORDER — MORPHINE SULFATE 2 MG/ML
2 INJECTION, SOLUTION INTRAMUSCULAR; INTRAVENOUS ONCE
Status: COMPLETED | OUTPATIENT
Start: 2024-06-06 | End: 2024-06-06

## 2024-06-06 RX ORDER — ACETAMINOPHEN 325 MG/1
325 TABLET ORAL ONCE
Status: DISCONTINUED | OUTPATIENT
Start: 2024-06-06 | End: 2024-06-07 | Stop reason: HOSPADM

## 2024-06-06 RX ORDER — ACETAMINOPHEN 325 MG/1
650 TABLET ORAL ONCE
Status: COMPLETED | OUTPATIENT
Start: 2024-06-06 | End: 2024-06-06

## 2024-06-06 RX ORDER — RIZATRIPTAN BENZOATE 10 MG/1
10 TABLET ORAL
COMMUNITY

## 2024-06-06 RX ORDER — 0.9 % SODIUM CHLORIDE 0.9 %
1000 INTRAVENOUS SOLUTION INTRAVENOUS ONCE
Status: DISCONTINUED | OUTPATIENT
Start: 2024-06-06 | End: 2024-06-07 | Stop reason: HOSPADM

## 2024-06-06 RX ORDER — KETOROLAC TROMETHAMINE 15 MG/ML
15 INJECTION, SOLUTION INTRAMUSCULAR; INTRAVENOUS ONCE
Status: COMPLETED | OUTPATIENT
Start: 2024-06-06 | End: 2024-06-06

## 2024-06-06 RX ADMIN — ACETAMINOPHEN 650 MG: 325 TABLET ORAL at 18:30

## 2024-06-06 RX ADMIN — MORPHINE SULFATE 2 MG: 2 INJECTION, SOLUTION INTRAMUSCULAR; INTRAVENOUS at 20:43

## 2024-06-06 RX ADMIN — SODIUM CHLORIDE 1000 ML: 9 INJECTION, SOLUTION INTRAVENOUS at 22:30

## 2024-06-06 RX ADMIN — VANCOMYCIN HYDROCHLORIDE 1500 MG: 1 INJECTION, POWDER, LYOPHILIZED, FOR SOLUTION INTRAVENOUS at 21:28

## 2024-06-06 RX ADMIN — KETOROLAC TROMETHAMINE 15 MG: 15 INJECTION, SOLUTION INTRAMUSCULAR; INTRAVENOUS at 21:24

## 2024-06-06 RX ADMIN — PIPERACILLIN AND TAZOBACTAM 4500 MG: 4; .5 INJECTION, POWDER, LYOPHILIZED, FOR SOLUTION INTRAVENOUS at 20:46

## 2024-06-06 ASSESSMENT — ENCOUNTER SYMPTOMS
BACK PAIN: 0
ABDOMINAL PAIN: 0
SORE THROAT: 1
SHORTNESS OF BREATH: 1
CONSTIPATION: 1
RHINORRHEA: 1
SINUS PAIN: 1
DIARRHEA: 0
WHEEZING: 0
COUGH: 0
CHEST TIGHTNESS: 1
NAUSEA: 1
VOMITING: 0

## 2024-06-06 ASSESSMENT — PAIN DESCRIPTION - LOCATION
LOCATION: HEAD

## 2024-06-06 ASSESSMENT — PAIN DESCRIPTION - DESCRIPTORS
DESCRIPTORS: ACHING
DESCRIPTORS: ACHING

## 2024-06-06 ASSESSMENT — PAIN DESCRIPTION - PAIN TYPE: TYPE: ACUTE PAIN

## 2024-06-06 ASSESSMENT — PAIN - FUNCTIONAL ASSESSMENT
PAIN_FUNCTIONAL_ASSESSMENT: 0-10
PAIN_FUNCTIONAL_ASSESSMENT: 0-10

## 2024-06-06 ASSESSMENT — PAIN SCALES - GENERAL
PAINLEVEL_OUTOF10: 8
PAINLEVEL_OUTOF10: 8
PAINLEVEL_OUTOF10: 10
PAINLEVEL_OUTOF10: 6

## 2024-06-06 ASSESSMENT — PAIN DESCRIPTION - FREQUENCY: FREQUENCY: CONTINUOUS

## 2024-06-06 NOTE — ED NOTES
Pt mother at nurses's station, pt mother states pt would like to leave , encouraged pt to stay for CT brain and test results.

## 2024-06-07 NOTE — DISCHARGE INSTRUCTIONS
Please understand that at this time there is no evidence for a more serious underlying process, but that early in the process of an illness or injury, an emergency department workup can be falsely reassuring.  You should contact your family doctor within the next 48 hours for a follow up appointment    THANK YOU!!!    From [facility] and [facility]    On behalf of the Emergency Department staff at [facility], I would like to thank you for giving us the opportunity to address your health care needs and concerns.    We hope that during your visit, our service was delivered in a professional and caring manner. Please keep [facility] in mind as we walk with you down the path to your own personal wellness.     Please expect an automated text message or email from us so we can ask a few questions about your health and progress. Based on your answers, a clinician may call you back to offer help and instructions.    Please understand that early in the process of an illness or injury, an emergency department workup can be falsely reassuring.  If you notice any worsening, changing or persistent symptoms please call your family doctor or return to the ER immediately.     Tell us how we did during your visit at [email or web address]   and let us know about your experience

## 2024-06-07 NOTE — ED PROVIDER NOTES
[facility] ED  [address] [address]  Phone: [phone]    eMERGENCY dEPARTMENT eNCOUnter        Pt Name: Radha Smyth  MRN: [id number]  Birthdate 1980  Date of evaluation: 6/6/24    CHIEF COMPLAINT     Chief Complaint   Patient presents with    Shortness of Breath     PT had an Remicade Infusion on May 10,2024. Pt has seen her Rheumatologist since then.     Headache    Chills       HISTORY OF PRESENT ILLNESS    Radha Smyth is a 44 y.o. female who presents to the emergency department with multiple concerns with several signs and symptoms since her Remicade infusion on May 10.  Patient stated that her rheumatologist Dr. Marti believes that this is due to her infusion.  He believes that she is having allergic reaction to the infusion.  This is her third infusion but it was a higher concentration.  Patient has subjective fevers and chills sore throat congestion head pain ear pain sore throat chest pain shortness of breath constipation nausea.      Incidentally she had a recent metatarsal fracture that she did  have a recent CAT scan.  No surgery is indicated.  She has a cam boot walker in place.  She had this follow-up appoint with orthopedics on 6/3.PLAN:   Patient with multiple right foot metatarsal fractures  Recommend stat CT of the right foot to rule out midfoot injury  Nonweightbearing right lower extremity in Cam boot  Aggressive ice and elevation right lower extremity  The patient has mobility limitation that significantly impairs her ability to accomplish related activities of daily living in her home.  Patient can safely use the walker and her mobility limitation can be resolved by the use of a walker  Pain control - currently using   Tylenol and ibuprofen.  Continue this course.  Calcium and vitamin-D.  Patient is a current smoker. We discussed smoking cessation for fracture healing and overall health. They were provided with educational materials today in office.    
Rapid screening tests are less sensitive than culture and if UTI is a clinical possibility, culture should be considered despite a negative urinalysis.     EKG 12 Lead   Result Value Ref Range    Ventricular Rate 121 BPM    Atrial Rate 121 BPM    P-R Interval 122 ms    QRS Duration 70 ms    Q-T Interval 300 ms    QTc Calculation (Bazett) 426 ms    P Axis 65 degrees    R Axis 65 degrees    T Axis 45 degrees       RECENT VITALS:  BP: 104/71, Temp: 98.8 °F (37.1 °C), Pulse: (!) 101, Respirations: 15     ED Course     The patient was given the following medications:  Orders Placed This Encounter   Medications    acetaminophen (TYLENOL) tablet 650 mg    sodium chloride 0.9 % bolus 1,000 mL    acetaminophen (TYLENOL) tablet 325 mg    morphine (PF) injection 2 mg    DISCONTD: piperacillin-tazobactam (ZOSYN) 3,375 mg in sodium chloride 0.9 % 50 mL IVPB (mini-bag)     Order Specific Question:   Antimicrobial Indications     Answer:   Sepsis of Unknown Etiology     Order Specific Question:   Sepsis duration of therapy     Answer:   Other    DISCONTD: vancomycin 1000 mg IVPB in 250 mL NS addavial     Order Specific Question:   Antimicrobial Indications     Answer:   Sepsis of Unknown Etiology     Order Specific Question:   Sepsis duration of therapy     Answer:   Other    piperacillin-tazobactam (ZOSYN) 4,500 mg in sodium chloride 0.9 % 100 mL IVPB (mini-bag)     Order Specific Question:   Antimicrobial Indications     Answer:   Sepsis of Unknown Etiology     Order Specific Question:   Sepsis duration of therapy     Answer:   Other    vancomycin (VANCOCIN) 1,500 mg in sodium chloride 0.9 % 250 mL IVPB     Order Specific Question:   Antimicrobial Indications     Answer:   Sepsis of Unknown Etiology     Order Specific Question:   Sepsis duration of therapy     Answer:   Other    ketorolac (TORADOL) injection 15 mg    sodium chloride 0.9 % bolus 1,000 mL     During the emergency department course, patient was given Tylenol 650 mg

## 2024-06-08 LAB
EKG ATRIAL RATE: 121 BPM
EKG P AXIS: 65 DEGREES
EKG P-R INTERVAL: 122 MS
EKG Q-T INTERVAL: 300 MS
EKG QRS DURATION: 70 MS
EKG QTC CALCULATION (BAZETT): 426 MS
EKG R AXIS: 65 DEGREES
EKG T AXIS: 45 DEGREES
EKG VENTRICULAR RATE: 121 BPM

## 2024-06-09 LAB
MICROORGANISM SPEC CULT: NORMAL
MICROORGANISM SPEC CULT: NORMAL
SERVICE CMNT-IMP: NORMAL
SERVICE CMNT-IMP: NORMAL
SPECIMEN DESCRIPTION: NORMAL
SPECIMEN DESCRIPTION: NORMAL

## 2024-07-16 ENCOUNTER — HOSPITAL ENCOUNTER (OUTPATIENT)
Age: 44
Setting detail: SPECIMEN
Discharge: HOME | End: 2024-07-16

## 2024-07-16 ENCOUNTER — OFFICE VISIT (OUTPATIENT)
Dept: OBGYN CLINIC | Age: 44
End: 2024-07-16
Payer: MEDICAID

## 2024-07-16 VITALS
HEIGHT: 63 IN | DIASTOLIC BLOOD PRESSURE: 67 MMHG | HEART RATE: 87 BPM | SYSTOLIC BLOOD PRESSURE: 120 MMHG | WEIGHT: 110.8 LBS | BODY MASS INDEX: 19.63 KG/M2

## 2024-07-16 DIAGNOSIS — N89.8 VAGINAL IRRITATION: ICD-10-CM

## 2024-07-16 DIAGNOSIS — R39.9 UTI SYMPTOMS: Primary | ICD-10-CM

## 2024-07-16 LAB
BILIRUBIN, POC: NORMAL
BLOOD URINE, POC: NORMAL
CLARITY, POC: CLEAR
COLOR, POC: CLEAR
GLUCOSE URINE, POC: NORMAL
KETONES, POC: NORMAL
LEUKOCYTE EST, POC: NORMAL
NITRITE, POC: NORMAL
PH, POC: NORMAL
PROTEIN, POC: NORMAL
SPECIFIC GRAVITY, POC: 1.02
UROBILINOGEN, POC: NORMAL

## 2024-07-16 PROCEDURE — G8420 CALC BMI NORM PARAMETERS: HCPCS

## 2024-07-16 PROCEDURE — 99212 OFFICE O/P EST SF 10 MIN: CPT

## 2024-07-16 PROCEDURE — G8427 DOCREV CUR MEDS BY ELIG CLIN: HCPCS

## 2024-07-16 PROCEDURE — 81003 URINALYSIS AUTO W/O SCOPE: CPT

## 2024-07-16 PROCEDURE — 4004F PT TOBACCO SCREEN RCVD TLK: CPT

## 2024-07-16 NOTE — PROGRESS NOTES
[facility], [facility] [place]  [address]  [address]  [place] [address]  Dept: [phone]     Radha Smyth is a 44 y.o. y.o. female who presents today for had concerns including Other (Painful urination, frequency, yeast infection symptoms per patient).Going on vacation next week and wants to feel better for her trip. Dealing with psoriasis - wondering if that is causing vaginal itching. recently she had an Allergic rxn to remicade and was hospitalized about a month ago. She was given multipe antibiotics during the course of her stay.    She is newly sexually active with a new partner   Menstrual pattern: She had been bleeding Infrequent following prior ablation  Contraception:  salpingectomy    Allergies   Allergen Reactions    Quetiapine Other (See Comments)     amnesia    Cleocin [Clindamycin Hcl] Nausea And Vomiting     Nausea, vomiting, dizzy, tomasz, itching back, legs, neck and arms.     Metronidazole Hives    Amoxicillin Nausea And Vomiting      Patient Active Problem List   Diagnosis    Osteoarthritis    Psoriasis    Migraine headache    Elevated blood pressure reading    LGSIL on Pap smear of cervix      Review of Systems:  Review of Systems   Genitourinary:  Positive for dysuria, frequency and vaginal discharge. Negative for decreased urine volume, difficulty urinating, dyspareunia, hematuria, menstrual problem, urgency and vaginal pain.   All other systems reviewed and are negative.   denies vaginal odor, Has itching, tenderness/pain    /67 (Site: Left Upper Arm, Position: Sitting, Cuff Size: Medium Adult)   Pulse 87   Ht 1.6 m (5' 3\")   Wt 50.3 kg (110 lb 12.8 oz)   BMI 19.63 kg/m²   Physical Exam  Constitutional:       General: She is not in acute distress.     Appearance: Normal appearance.   Genitourinary:      Vulva and urethral meatus normal.      Right Labia: No tenderness, lesions or skin changes.     Left Labia: No

## 2024-07-17 DIAGNOSIS — N89.8 VAGINAL IRRITATION: ICD-10-CM

## 2024-07-17 DIAGNOSIS — R39.9 UTI SYMPTOMS: ICD-10-CM

## 2024-07-17 LAB
BILIRUB UR QL STRIP: NEGATIVE
CANDIDA SPECIES: NEGATIVE
CLARITY UR: ABNORMAL
COLOR UR: YELLOW
EPI CELLS #/AREA URNS HPF: NORMAL /HPF (ref 0–5)
GARDNERELLA VAGINALIS: NEGATIVE
GLUCOSE UR STRIP-MCNC: NEGATIVE MG/DL
HGB UR QL STRIP.AUTO: NEGATIVE
KETONES UR STRIP-MCNC: NEGATIVE MG/DL
LEUKOCYTE ESTERASE UR QL STRIP: NEGATIVE
NITRITE UR QL STRIP: NEGATIVE
PH UR STRIP: 7 [PH] (ref 5–8)
PROT UR STRIP-MCNC: NEGATIVE MG/DL
RBC #/AREA URNS HPF: NORMAL /HPF (ref 0–4)
SOURCE: NORMAL
SP GR UR STRIP: 1.02 (ref 1–1.03)
TRICHOMONAS: NEGATIVE
UROBILINOGEN UR STRIP-ACNC: NORMAL EU/DL (ref 0–1)
WBC #/AREA URNS HPF: NORMAL /HPF (ref 0–5)

## 2024-07-18 LAB
C TRACH DNA SPEC QL PROBE+SIG AMP: NEGATIVE
MICROORGANISM SPEC CULT: NORMAL
N GONORRHOEA DNA SPEC QL PROBE+SIG AMP: NEGATIVE
SERVICE CMNT-IMP: NORMAL
SPECIMEN DESCRIPTION: NORMAL
SPECIMEN DESCRIPTION: NORMAL

## 2024-07-20 RX ORDER — PHENAZOPYRIDINE HYDROCHLORIDE 100 MG/1
100 TABLET, FILM COATED ORAL 3 TIMES DAILY PRN
Qty: 9 TABLET | Refills: 0 | Status: SHIPPED | OUTPATIENT
Start: 2024-07-20 | End: 2024-07-23

## 2024-08-19 ENCOUNTER — OFFICE VISIT (OUTPATIENT)
Dept: OBGYN CLINIC | Age: 44
End: 2024-08-19
Payer: MEDICAID

## 2024-08-19 ENCOUNTER — HOSPITAL ENCOUNTER (OUTPATIENT)
Age: 44
Setting detail: SPECIMEN
Discharge: HOME | End: 2024-08-19

## 2024-08-19 VITALS
BODY MASS INDEX: 17.86 KG/M2 | WEIGHT: 107.2 LBS | HEIGHT: 65 IN | SYSTOLIC BLOOD PRESSURE: 140 MMHG | DIASTOLIC BLOOD PRESSURE: 89 MMHG | HEART RATE: 109 BPM

## 2024-08-19 DIAGNOSIS — N89.8 VAGINAL ITCHING: ICD-10-CM

## 2024-08-19 DIAGNOSIS — N89.8 VAGINAL IRRITATION: ICD-10-CM

## 2024-08-19 DIAGNOSIS — N89.8 VAGINAL ITCHING: Primary | ICD-10-CM

## 2024-08-19 DIAGNOSIS — L40.9 PSORIASIS: ICD-10-CM

## 2024-08-19 LAB
BILIRUB UR QL STRIP: NEGATIVE
CLARITY UR: CLEAR
COLOR UR: YELLOW
COMMENT: NORMAL
GLUCOSE UR STRIP-MCNC: NEGATIVE MG/DL
HGB UR QL STRIP.AUTO: NEGATIVE
KETONES UR STRIP-MCNC: NEGATIVE MG/DL
LEUKOCYTE ESTERASE UR QL STRIP: NEGATIVE
NITRITE UR QL STRIP: NEGATIVE
PH UR STRIP: 7.5 [PH] (ref 5–8)
PROT UR STRIP-MCNC: NEGATIVE MG/DL
SP GR UR STRIP: 1.01 (ref 1–1.03)
UROBILINOGEN UR STRIP-ACNC: NORMAL EU/DL (ref 0–1)

## 2024-08-19 PROCEDURE — 99213 OFFICE O/P EST LOW 20 MIN: CPT | Performed by: STUDENT IN AN ORGANIZED HEALTH CARE EDUCATION/TRAINING PROGRAM

## 2024-08-19 NOTE — PROGRESS NOTES
OB/GYN Problem Visit    Radha Smyth  2024                       Primary Care Physician: Guille Fernández MD    CC:   Chief Complaint   Patient presents with    swabs     UTI symptoms vaginal soreness, frequency, pinchy feeling, itching, discharge.          HPI: Radha Smyth is a 44 y.o. female     The patient was seen and examined. She is here for vaginal soreness with itching and discharge. Patient has psoriasis and states sometimes she has flare ups in the groin and genital region. She is unsure if this is an infection or if it is a psoriasis flare. She also thinks she could have a UTI because she has been urinating frequently. She has some concerns about a small freckle/lesion on her left gluteal fold because it has been getting bigger and she desires removal. She is following with her dermatologist for her psoriasis.    She does have a new sexual partner, but was recently tested for STIs and is not concerned at this time about STIs..    REVIEW OF SYSTEMS:   Constitutional: negative fever, negative chills   HEENT: negative visual disturbances, negative headaches  Respiratory: negative dyspnea, negative cough  Cardiovascular: negative chest pain,  negative palpitations  Gastrointestinal: negative abdominal pain, negative RUQ pain, negative N/V, negative diarrhea, negative constipation  Genitourinary: negative dysuria, + vaginal soreness, itching, pain  Dermatological: negative rash  Hematologic: negative bruising  Immunologic/Lymphatic: negative recent illness, negative recent sick contact  Musculoskeletal: negative back pain, negative myalgias, negative arthralgias  Neurological:  negative dizziness, negative weakness  Behavior/Psych: negative depression, negative anxiety      OBSTETRICAL HISTORY:  OB History    Para Term  AB Living   2 2 2 0 0 2   SAB IAB Ectopic Molar Multiple Live Births   0 0 0   0 2      # Outcome Date GA Lbr Pieter/2nd Weight Sex Type Anes PTL Lv   2

## 2024-08-20 LAB
CANDIDA SPECIES: NEGATIVE
GARDNERELLA VAGINALIS: NEGATIVE
SOURCE: NORMAL
TRICHOMONAS: NEGATIVE

## 2024-10-21 ENCOUNTER — HOSPITAL ENCOUNTER (OUTPATIENT)
Dept: PHYSICAL THERAPY | Facility: CLINIC | Age: 44
Setting detail: THERAPIES SERIES
Discharge: HOME | End: 2024-10-21
Payer: MEDICAID

## 2024-10-21 PROCEDURE — 97162 PT EVAL MOD COMPLEX 30 MIN: CPT

## 2024-10-21 PROCEDURE — 97140 MANUAL THERAPY 1/> REGIONS: CPT

## 2024-10-21 NOTE — CONSULTS
[] [facility]  Outpatient Rehabilitation &  Therapy  [address]  [phone]  [phone] [x] [facility]  Outpatient Rehabilitation &  Therapy  [address]  P: [phone]  F: [phone] [] [facility]  Outpatient Rehabilitation &  Therapy  [address]  P: [phone]  F: [phone] [] [facility]  Outpatient Rehabilitation &  Therapy  [address]  [phone]  [phone] [] [facility]  Outpatient Rehabilitation &  Therapy  [address]   P: [phone]  F: [phone]  [] [facility]  Outpatient Rehabilitation &  Therapy  [address]  P: [phone]  F: [phone] [] [facility]  Outpatient Rehabilitation &  Therapy  [address]  P: [phone]  F: [phone] [] [facility]  Outpatient Rehabilitation &  Therapy  [address]  P: [phone]  F: [phone] [] [facility]  Outpatient Rehabilitation &  Therapy  [address]  P: [phone]  F: [phone]  [] [facility] Outpatient Rehabilitation &  Therapy  [address]  P: [phone]  F: [phone]     Physical Therapy Spine Evaluation    Date:  10/21/2024  Patient: Radha Smyth  : 1980  MRN: [id number]  Physician: Sonido Nunez MD     Insurance: [employer] [place] AUTH AFTER EVAL  Medical Diagnosis: Cervicalgia (M54.2)    Rehab Codes: M54.13; M54.2; R51; M79.1; M62.838; M79.601; R29.3; M62.591; R53.81; R20.2  Onset Date: 10/1/23 approx    Next 's appt.:     Subjective:   CC: pt with pain and spasms from occiput down R UE to fingers including R thumb and index fingers with n/t that is constant. Not taking any medication. Not helpful to use heat or ice. Any activity using the R UE makes the symptoms worse

## 2024-10-28 ENCOUNTER — HOSPITAL ENCOUNTER (OUTPATIENT)
Dept: PHYSICAL THERAPY | Facility: CLINIC | Age: 44
Setting detail: THERAPIES SERIES
Discharge: HOME | End: 2024-10-28
Payer: MEDICAID

## 2024-10-28 NOTE — FLOWSHEET NOTE
[] [facility]  Outpatient Rehabilitation &  Therapy  [address]  [phone]  [phone] [x] [facility]  Outpatient Rehabilitation &  Therapy  [address]  P: [phone]  F: [phone] [] [facility]  Outpatient Rehabilitation &  Therapy  [address]  P: [phone]  F: [phone] [] [facility]  Outpatient Rehabilitation &  Therapy  [address]  [phone]  [phone] [] [facility]  Outpatient Rehabilitation &  Therapy  [address]   P: [phone]  F: [phone]  [] [facility]  Outpatient Rehabilitation &  Therapy  [address]  P: [phone]  F: [phone] [] [facility]  Outpatient Rehabilitation &  Therapy  [address]  P: [phone]  F: [phone] [] [facility]  Outpatient Rehabilitation &  Therapy  [address]  P: [phone]  F: [phone] [] [facility]  Outpatient Rehabilitation &  Therapy  [address]  P: [phone]  F: [phone]  [] [facility] Outpatient Rehabilitation &  Therapy  [address]  P: [phone]  F: [phone]     Therapy Cancel/No Show note    Date: 10/28/2024  Patient: Radha Smyth  : 1980  MRN: [id number]    Cancels/No Shows to date: 1 cx    For today's appointment patient:      [x]  Cancelled     Reason given by patient:      [x] Other:   has to take child to school      [] Next appointment was confirmed    Electronically signed by: BALTAZAR PEREZ, PT

## 2024-11-07 ENCOUNTER — HOSPITAL ENCOUNTER (OUTPATIENT)
Dept: PHYSICAL THERAPY | Facility: CLINIC | Age: 44
Setting detail: THERAPIES SERIES
Discharge: HOME | End: 2024-11-07

## 2024-11-07 NOTE — FLOWSHEET NOTE
[] [facility]  Outpatient Rehabilitation &  Therapy  [address]  [phone]  [phone] [x] [facility]  Outpatient Rehabilitation &  Therapy  [address]  P: [phone]  F: [phone] [] [facility]  Outpatient Rehabilitation &  Therapy  [address]  P: [phone]  F: [phone] [] [facility]  Outpatient Rehabilitation &  Therapy  [address]  [phone]  [phone] [] [facility]  Outpatient Rehabilitation &  Therapy  [address]   P: [phone]  F: [phone]  [] [facility]  Outpatient Rehabilitation &  Therapy  [address]  P: [phone]  F: [phone] [] [facility]  Outpatient Rehabilitation &  Therapy  [address]  P: [phone]  F: [phone] [] [facility]  Outpatient Rehabilitation &  Therapy  [address]  P: [phone]  F: [phone] [] [facility]  Outpatient Rehabilitation &  Therapy  [address]  P: [phone]  F: [phone]  [] [facility] Outpatient Rehabilitation &  Therapy  [address]  P: [phone]  F: [phone]     Therapy Cancel/No Show note    Date: 2024  Patient: Radha Smyth  : 1980  MRN: [id number]    Cancels/No Shows to date: 2 cx/1 ns    For today's appointment patient:      [x]  Cancelled     Reason given by patient:    [x]  Patient ill       Comments:  left message with patient to contact us to schedule next appointment      [x] Next appointment was NOT confirmed and needs changed    Electronically signed by: BALTAZAR PEREZ, PT

## 2025-01-10 ENCOUNTER — APPOINTMENT (OUTPATIENT)
Dept: GENERAL RADIOLOGY | Facility: CLINIC | Age: 45
End: 2025-01-10
Payer: MEDICAID

## 2025-01-10 ENCOUNTER — HOSPITAL ENCOUNTER (EMERGENCY)
Facility: CLINIC | Age: 45
Discharge: HOME | End: 2025-01-10
Attending: STUDENT IN AN ORGANIZED HEALTH CARE EDUCATION/TRAINING PROGRAM
Payer: MEDICAID

## 2025-01-10 VITALS
OXYGEN SATURATION: 97 % | RESPIRATION RATE: 17 BRPM | WEIGHT: 86 LBS | HEART RATE: 87 BPM | TEMPERATURE: 98 F | SYSTOLIC BLOOD PRESSURE: 140 MMHG | DIASTOLIC BLOOD PRESSURE: 99 MMHG | BODY MASS INDEX: 14.31 KG/M2

## 2025-01-10 DIAGNOSIS — M79.89 BILATERAL SWELLING OF FEET: Primary | ICD-10-CM

## 2025-01-10 LAB
ALBUMIN SERPL-MCNC: 3.9 G/DL (ref 3.5–5.2)
ALBUMIN/GLOB SERPL: 1.4 {RATIO}
ALP SERPL-CCNC: 65 U/L (ref 35–104)
ALT SERPL-CCNC: 16 U/L (ref 10–35)
AMORPH SED URNS QL MICRO: ABNORMAL
ANION GAP SERPL CALCULATED.3IONS-SCNC: 8 MMOL/L (ref 9–16)
AST SERPL-CCNC: 25 U/L (ref 10–35)
BACTERIA URNS QL MICRO: ABNORMAL
BASOPHILS # BLD: <0.03 K/UL (ref 0–0.2)
BASOPHILS NFR BLD: 0 % (ref 0–2)
BILIRUB SERPL-MCNC: <0.2 MG/DL (ref 0–1.2)
BILIRUB UR QL STRIP: NEGATIVE
BNP SERPL-MCNC: 165 PG/ML (ref 0–300)
BUN SERPL-MCNC: 6 MG/DL (ref 6–20)
CALCIUM SERPL-MCNC: 9.2 MG/DL (ref 8.6–10.4)
CHARACTER UR: ABNORMAL
CHLORIDE SERPL-SCNC: 107 MMOL/L (ref 98–107)
CLARITY UR: ABNORMAL
CO2 SERPL-SCNC: 26 MMOL/L (ref 20–31)
COLOR UR: YELLOW
CREAT SERPL-MCNC: 0.7 MG/DL (ref 0.5–0.9)
EOSINOPHIL # BLD: 0.03 K/UL (ref 0–0.44)
EOSINOPHILS RELATIVE PERCENT: 1 % (ref 1–4)
EPI CELLS #/AREA URNS HPF: ABNORMAL /HPF (ref 0–5)
ERYTHROCYTE [DISTWIDTH] IN BLOOD BY AUTOMATED COUNT: 13.1 % (ref 11.8–14.4)
GFR, ESTIMATED: >90 ML/MIN/1.73M2
GLUCOSE SERPL-MCNC: 109 MG/DL (ref 74–99)
GLUCOSE UR STRIP-MCNC: NEGATIVE MG/DL
HCT VFR BLD AUTO: 39.3 % (ref 36.3–47.1)
HGB BLD-MCNC: 13.4 G/DL (ref 11.9–15.1)
HGB UR QL STRIP.AUTO: ABNORMAL
IMM GRANULOCYTES # BLD AUTO: 0.05 K/UL (ref 0–0.3)
IMM GRANULOCYTES NFR BLD: 1 %
KETONES UR STRIP-MCNC: NEGATIVE MG/DL
LEUKOCYTE ESTERASE UR QL STRIP: NEGATIVE
LYMPHOCYTES NFR BLD: 1.22 K/UL (ref 1.1–3.7)
LYMPHOCYTES RELATIVE PERCENT: 20 % (ref 24–43)
MCH RBC QN AUTO: 32.7 PG (ref 25.2–33.5)
MCHC RBC AUTO-ENTMCNC: 34.1 G/DL (ref 28.4–34.8)
MCV RBC AUTO: 95.9 FL (ref 82.6–102.9)
MONOCYTES NFR BLD: 0.35 K/UL (ref 0.1–1.2)
MONOCYTES NFR BLD: 6 % (ref 3–12)
NEUTROPHILS NFR BLD: 72 % (ref 36–65)
NEUTS SEG NFR BLD: 4.49 K/UL (ref 1.5–8.1)
NITRITE UR QL STRIP: NEGATIVE
NRBC BLD-RTO: 0 PER 100 WBC
PH UR STRIP: 7 [PH] (ref 5–8)
PLATELET # BLD AUTO: 266 K/UL (ref 138–453)
PMV BLD AUTO: 10 FL (ref 8.1–13.5)
POTASSIUM SERPL-SCNC: 3.9 MMOL/L (ref 3.7–5.3)
PROT SERPL-MCNC: 6.7 G/DL (ref 6.6–8.7)
PROT UR STRIP-MCNC: NEGATIVE MG/DL
RBC # BLD AUTO: 4.1 M/UL (ref 3.95–5.11)
RBC #/AREA URNS HPF: ABNORMAL /HPF (ref 0–2)
SODIUM SERPL-SCNC: 141 MMOL/L (ref 136–145)
SP GR UR STRIP: 1.02 (ref 1–1.03)
TSH SERPL DL<=0.05 MIU/L-ACNC: 2.7 UIU/ML (ref 0.27–4.2)
UROBILINOGEN UR STRIP-ACNC: NORMAL EU/DL (ref 0–1)
WBC #/AREA URNS HPF: ABNORMAL /HPF (ref 0–5)
WBC OTHER # BLD: 6.2 K/UL (ref 3.5–11.3)

## 2025-01-10 PROCEDURE — 71046 X-RAY EXAM CHEST 2 VIEWS: CPT

## 2025-01-10 PROCEDURE — 83880 ASSAY OF NATRIURETIC PEPTIDE: CPT

## 2025-01-10 PROCEDURE — 99284 EMERGENCY DEPT VISIT MOD MDM: CPT

## 2025-01-10 PROCEDURE — 85025 COMPLETE CBC W/AUTO DIFF WBC: CPT

## 2025-01-10 PROCEDURE — 84443 ASSAY THYROID STIM HORMONE: CPT

## 2025-01-10 PROCEDURE — 80053 COMPREHEN METABOLIC PANEL: CPT

## 2025-01-10 PROCEDURE — 36415 COLL VENOUS BLD VENIPUNCTURE: CPT

## 2025-01-10 PROCEDURE — 81001 URINALYSIS AUTO W/SCOPE: CPT

## 2025-01-10 ASSESSMENT — ENCOUNTER SYMPTOMS
BACK PAIN: 0
VOMITING: 0
NAUSEA: 0
COUGH: 1
SHORTNESS OF BREATH: 0
ABDOMINAL PAIN: 0

## 2025-01-10 ASSESSMENT — PAIN SCALES - GENERAL: PAINLEVEL_OUTOF10: 4

## 2025-01-10 ASSESSMENT — PAIN - FUNCTIONAL ASSESSMENT: PAIN_FUNCTIONAL_ASSESSMENT: 0-10

## 2025-01-10 NOTE — DISCHARGE INSTRUCTIONS
We evaluated you for your foot swelling.  As we discussed your workup here was overall unremarkable.  Your thyroid studies and your CBC were still pending.  Follow these up on your MyChart account.    Follow closely with your primary doctor as well as your dermatologist.    Return to the emergency department if you develop any worsening or concerning symptoms.

## 2025-01-10 NOTE — ED PROVIDER NOTES
[facility]  [address] [id number]  Phone: [phone]        [facility] EMERGENCY DEPARTMENT  EMERGENCY DEPARTMENT ENCOUNTER      Pt Name: Radha Smyth  MRN: [id number]  Birthdate 1980  Date of evaluation: 1/10/2025  Provider: Madonna Escoto DO    CHIEF COMPLAINT       Chief Complaint   Patient presents with    Foot Swelling       HISTORY OF PRESENT ILLNESS   (Location/Symptom, Timing/Onset,Context/Setting, Quality, Duration, Modifying Factors, Severity)  Note limiting factors.     Radha Smyth is a 44 y.o. female who presents to the emergency department with multiple complaints.  Patient states she developed some bilateral foot swelling starting about 4 days ago.  Patient states she has not been well for the past few months.  Was taking a medication called Bimzelx, biologic for psoriatic arthritis as well as rheumatoid arthritis.  Patient states she was not tolerated this medication.  Was dosed monthly, last dose was approximately 1 month ago, is no longer taking it.  Did have excessive weight loss however is starting to gain some weight back now that she is off the medication.  Patient states however 4 days ago her feet started swelling.  No known injuries.  She saw her primary doctor a few days ago who was concerned that this may possibly be related to the medication.  Reportedly primary doctor wanted labs done and evaluation in the ER, unable to see any of these records.  Patient denies any chest pain or shortness of breath.  No fevers.  No history of heart failure.  Patient states her swelling today is actually better than it was yesterday.    Nursing Notes were reviewed.    REVIEW OF SYSTEMS       Review of Systems   Constitutional:  Positive for unexpected weight change. Negative for chills and fever.   HENT:  Negative for congestion.    Respiratory:  Positive for cough. Negative for shortness of breath.    Cardiovascular:  Positive for leg swelling.

## 2025-07-03 ENCOUNTER — HOSPITAL ENCOUNTER (OUTPATIENT)
Age: 45
Setting detail: SPECIMEN
Discharge: HOME | End: 2025-07-03

## 2025-07-03 ENCOUNTER — OFFICE VISIT (OUTPATIENT)
Dept: OBGYN CLINIC | Age: 45
End: 2025-07-03
Payer: MEDICAID

## 2025-07-03 VITALS
SYSTOLIC BLOOD PRESSURE: 126 MMHG | WEIGHT: 117.8 LBS | HEIGHT: 64 IN | BODY MASS INDEX: 20.11 KG/M2 | DIASTOLIC BLOOD PRESSURE: 80 MMHG

## 2025-07-03 DIAGNOSIS — N88.2 CERVICAL STENOSIS (UTERINE CERVIX): ICD-10-CM

## 2025-07-03 DIAGNOSIS — Z11.3 ROUTINE SCREENING FOR STI (SEXUALLY TRANSMITTED INFECTION): ICD-10-CM

## 2025-07-03 DIAGNOSIS — Z01.419 WOMEN'S ANNUAL ROUTINE GYNECOLOGICAL EXAMINATION: Primary | ICD-10-CM

## 2025-07-03 DIAGNOSIS — Z12.31 SCREENING MAMMOGRAM FOR BREAST CANCER: ICD-10-CM

## 2025-07-03 DIAGNOSIS — B97.7 HPV IN FEMALE: ICD-10-CM

## 2025-07-03 LAB
C TRACH DNA SPEC QL PROBE+SIG AMP: NORMAL
N GONORRHOEA DNA SPEC QL PROBE+SIG AMP: NORMAL
SPECIMEN DESCRIPTION: NORMAL

## 2025-07-03 PROCEDURE — 99396 PREV VISIT EST AGE 40-64: CPT | Performed by: NURSE PRACTITIONER

## 2025-07-03 RX ORDER — TAPINAROF 10 MG/1000MG
CREAM TOPICAL
COMMUNITY
Start: 2025-05-07

## 2025-07-03 RX ORDER — CLONIDINE HYDROCHLORIDE 0.1 MG/1
0.1 TABLET ORAL 2 TIMES DAILY
COMMUNITY
Start: 2025-07-01

## 2025-07-03 RX ORDER — CYCLOSPORINE 0.5 MG/ML
EMULSION OPHTHALMIC
COMMUNITY
Start: 2024-07-10

## 2025-07-03 RX ORDER — ORPHENADRINE CITRATE 100 MG/1
100 TABLET ORAL DAILY
COMMUNITY
Start: 2024-05-05 | End: 2025-07-03

## 2025-07-03 RX ORDER — DOCUSATE SODIUM 100 MG/1
100 CAPSULE, LIQUID FILLED ORAL 2 TIMES DAILY
COMMUNITY
Start: 2025-06-05

## 2025-07-03 RX ORDER — CICLOPIROX 7.7 MG/G
GEL TOPICAL 2 TIMES DAILY
COMMUNITY
Start: 2025-05-14 | End: 2025-07-03

## 2025-07-03 RX ORDER — LIOTHYRONINE SODIUM 5 UG/1
5 TABLET ORAL DAILY
COMMUNITY
End: 2025-07-03

## 2025-07-03 RX ORDER — NIFEDIPINE 30 MG
30 TABLET, EXTENDED RELEASE ORAL DAILY
COMMUNITY
Start: 2025-02-17 | End: 2025-07-03

## 2025-07-03 RX ORDER — GALANTAMINE 4 MG/1
4 TABLET, FILM COATED ORAL
COMMUNITY
End: 2025-07-03

## 2025-07-03 RX ORDER — FLUCONAZOLE 150 MG/1
150 TABLET ORAL DAILY
COMMUNITY
Start: 2025-05-01 | End: 2025-07-03

## 2025-07-03 RX ORDER — DIAZEPAM 10 MG/1
5 TABLET ORAL NIGHTLY PRN
COMMUNITY
Start: 2024-04-24

## 2025-07-03 RX ORDER — GABAPENTIN 300 MG/1
300 CAPSULE ORAL 3 TIMES DAILY
COMMUNITY
Start: 2024-04-18 | End: 2025-07-03

## 2025-07-03 RX ORDER — NEOMYCIN SULFATE, POLYMYXIN B SULFATE AND GRAMICIDIN 1.75; 10000; .025 MG/ML; [USP'U]/ML; MG/ML
SOLUTION/ DROPS OPHTHALMIC
COMMUNITY
Start: 2025-05-02

## 2025-07-03 RX ORDER — ACETAMINOPHEN 500 MG
1000 TABLET ORAL EVERY 6 HOURS PRN
COMMUNITY
Start: 2024-05-30

## 2025-07-03 RX ORDER — PREGABALIN 75 MG/1
75 CAPSULE ORAL 2 TIMES DAILY
COMMUNITY
Start: 2024-10-15 | End: 2025-07-03

## 2025-07-03 RX ORDER — BIMEKIZUMAB 160 MG/ML
160 INJECTION, SOLUTION SUBCUTANEOUS
COMMUNITY
End: 2025-07-03

## 2025-07-03 RX ORDER — DULOXETIN HYDROCHLORIDE 30 MG/1
CAPSULE, DELAYED RELEASE ORAL
COMMUNITY
Start: 2025-06-20

## 2025-07-03 RX ORDER — CYPROHEPTADINE HYDROCHLORIDE 4 MG/1
4 TABLET ORAL
COMMUNITY
Start: 2024-12-16 | End: 2025-07-03

## 2025-07-03 RX ORDER — ATENOLOL 25 MG/1
25 TABLET ORAL
COMMUNITY
Start: 2024-08-29

## 2025-07-03 ASSESSMENT — ENCOUNTER SYMPTOMS
ALLERGIC/IMMUNOLOGIC NEGATIVE: 1
SHORTNESS OF BREATH: 0
ABDOMINAL DISTENTION: 0
BACK PAIN: 0
RESPIRATORY NEGATIVE: 1
GASTROINTESTINAL NEGATIVE: 1
COUGH: 0

## 2025-07-03 NOTE — PROGRESS NOTES
[facility], [facility] [place]  [address]  [address]  [place] [place] [facility] [place] [address]  Dept: [phone]      7/3/25    Radha Smyth     Gyn Annual Exam      CHIEF COMPLAINT:    Chief Complaint   Patient presents with    Annual Exam              Blood pressure 126/80, height 1.626 m (5' 4\"), weight 53.4 kg (117 lb 12.8 oz), not currently breastfeeding.           HPI :     Radha Smyth 1980 is a 45 y.o.   who is here for her annual exam. She is in a new relationship and would like STI testing.  She has trouble achieving orgasm which is bothersome to her.  No libido issues.  She is on Cymbalta and understands it could be a medication issue  Last pap 2023 NILM +hpv other types.  She has had an ablation with a salpingectomy  Hx NINO 1 2021     _____________________________________________________________________  Past Medical History:   Diagnosis Date    Arthritis     Atypical squamous cells of undetermined significance (ASCUS) on Papanicolaou smear of cervix 2018    HPV+    History of vaginal bleeding     constant bled for 5 yrs cause unknown    LGSIL on Pap smear of cervix 2021    hpv+    Migraine headache 07/10/2012    Osteoarthritis     with rheamuatoid arthritis    Ovarian cyst     Psoriasis     Renal lithiasis     Hx of kidney stones    Tachycardia     acute tachycardia.  Had 2 ablations    Thyroid disease     treated as teen.  No problem since then    Varicosities                                                                    Past Surgical History:   Procedure Laterality Date    APPENDECTOMY      CARDIAC SURGERY      x 2 for tacycardia resting heart rate now      SECTION, LOW TRANSVERSE  09/15/2012    COLPOSCOPY  10/19/2018    1oclock & 6oclock LSIL    COLPOSCOPY  2019    COLPOSCOPY  2020    Bx NINO-I    COLPOSCOPY N/A 2021    CIN1    CYSTOSCOPY  2018    FOOT SURGERY      for

## 2025-07-08 ENCOUNTER — RESULTS FOLLOW-UP (OUTPATIENT)
Dept: OBGYN CLINIC | Age: 45
End: 2025-07-08

## 2025-07-08 LAB
CANDIDA SPECIES: NEGATIVE
GARDNERELLA VAGINALIS: NEGATIVE
PHYSICIAN ID COMMENT: NORMAL
PHYSICIAN: NORMAL
SOURCE: NORMAL
SPECIMEN SOURCE: NORMAL
TRICHOMONAS: NEGATIVE
ZZ NTE CLEAN UP: ORDERED TEST: NORMAL

## 2025-07-09 LAB
C TRACH DNA SPEC QL PROBE+SIG AMP: NEGATIVE
HPV I/H RISK 4 DNA CVX QL NAA+PROBE: DETECTED
HPV SAMPLE: ABNORMAL
HPV, INTERPRETATION: ABNORMAL
HPV16 DNA CVX QL NAA+PROBE: NOT DETECTED
HPV18 DNA CVX QL NAA+PROBE: NOT DETECTED
N GONORRHOEA DNA SPEC QL PROBE+SIG AMP: NEGATIVE
SPECIMEN DESCRIPTION: ABNORMAL
SPECIMEN DESCRIPTION: NORMAL

## 2025-07-17 LAB — CYTOLOGY REPORT: NORMAL

## 2025-07-21 ENCOUNTER — HOSPITAL ENCOUNTER (OUTPATIENT)
Age: 45
Setting detail: SPECIMEN
Discharge: HOME | End: 2025-07-21

## 2025-07-21 DIAGNOSIS — R35.0 URINARY FREQUENCY: Primary | ICD-10-CM

## 2025-07-21 DIAGNOSIS — R35.0 URINARY FREQUENCY: ICD-10-CM

## 2025-07-21 LAB
BACTERIA URNS QL MICRO: ABNORMAL
BILIRUB UR QL STRIP: NEGATIVE
CASTS #/AREA URNS LPF: ABNORMAL /LPF (ref 0–8)
CLARITY UR: ABNORMAL
COLOR UR: YELLOW
EPI CELLS #/AREA URNS HPF: ABNORMAL /HPF (ref 0–5)
GLUCOSE UR STRIP-MCNC: NEGATIVE MG/DL
HGB UR QL STRIP.AUTO: NEGATIVE
KETONES UR STRIP-MCNC: NEGATIVE MG/DL
LEUKOCYTE ESTERASE UR QL STRIP: NEGATIVE
NITRITE UR QL STRIP: NEGATIVE
PH UR STRIP: 6.5 [PH] (ref 5–8)
PROT UR STRIP-MCNC: NEGATIVE MG/DL
RBC #/AREA URNS HPF: ABNORMAL /HPF (ref 0–4)
SP GR UR STRIP: 1.01 (ref 1–1.03)
UROBILINOGEN UR STRIP-ACNC: NORMAL EU/DL (ref 0–1)
WBC #/AREA URNS HPF: ABNORMAL /HPF (ref 0–5)

## 2025-07-22 LAB
MICROORGANISM SPEC CULT: NORMAL
SERVICE CMNT-IMP: NORMAL
SPECIMEN DESCRIPTION: NORMAL

## 2025-07-24 ENCOUNTER — TELEPHONE (OUTPATIENT)
Dept: OBGYN CLINIC | Age: 45
End: 2025-07-24

## 2025-07-24 NOTE — TELEPHONE ENCOUNTER
Pt calling was seen 7/3/25 STD cultures done & all negative no change in partner, had urine testing 7/21/25 which was also negative   Had intercourse & noticed razor like burning during intercourse   Advised lubricant & vaginal moisturizer pt states she doesn't feel dryness is the issues   Pt has psoriasis & is wondering if the psoriasis is present in vagina or is there a way to see if it is   Please advise

## 2025-07-25 ENCOUNTER — HOSPITAL ENCOUNTER (OUTPATIENT)
Age: 45
Setting detail: SPECIMEN
Discharge: HOME | End: 2025-07-25

## 2025-07-25 ENCOUNTER — OFFICE VISIT (OUTPATIENT)
Dept: OBGYN CLINIC | Age: 45
End: 2025-07-25
Payer: MEDICAID

## 2025-07-25 VITALS
SYSTOLIC BLOOD PRESSURE: 122 MMHG | HEART RATE: 96 BPM | DIASTOLIC BLOOD PRESSURE: 91 MMHG | HEIGHT: 63 IN | BODY MASS INDEX: 20.87 KG/M2

## 2025-07-25 DIAGNOSIS — N89.8 VAGINAL ITCHING: Primary | ICD-10-CM

## 2025-07-25 DIAGNOSIS — N89.8 VAGINAL ITCHING: ICD-10-CM

## 2025-07-25 PROCEDURE — G8420 CALC BMI NORM PARAMETERS: HCPCS | Performed by: NURSE PRACTITIONER

## 2025-07-25 PROCEDURE — 99213 OFFICE O/P EST LOW 20 MIN: CPT | Performed by: NURSE PRACTITIONER

## 2025-07-25 PROCEDURE — G8427 DOCREV CUR MEDS BY ELIG CLIN: HCPCS | Performed by: NURSE PRACTITIONER

## 2025-07-25 PROCEDURE — 4004F PT TOBACCO SCREEN RCVD TLK: CPT | Performed by: NURSE PRACTITIONER

## 2025-07-25 RX ORDER — CLOTRIMAZOLE AND BETAMETHASONE DIPROPIONATE 10; .64 MG/G; MG/G
CREAM TOPICAL
Qty: 45 G | Refills: 1 | Status: SHIPPED | OUTPATIENT
Start: 2025-07-25

## 2025-07-25 ASSESSMENT — ENCOUNTER SYMPTOMS
RESPIRATORY NEGATIVE: 1
GASTROINTESTINAL NEGATIVE: 1

## 2025-07-25 NOTE — PROGRESS NOTES
[facility], [facility]  [address]  [address]  [place] [address]  Dept: [phone]  Dept Fax: [phone]         2025  Radha Smyth       Chief Complaint  Chief Complaint   Patient presents with    Vaginal Itching     Itching and burning and red at times . Having a odor possible BV. She tool 2 Diflucan which did not help    .    Blood pressure (!) 122/91, pulse 96, height 1.6 m (5' 3\"), not currently breastfeeding.    HPI:     Radha Smyth  1980 is a 45 y.o.  here today for evaluation of vaginal itching not improved with diflucan.  She has psoriasis and has some patches externally in the vulvar area and is worried there may be patches internally      OB History    Para Term  AB Living   2 2 2 0 0 2   SAB IAB Ectopic Molar Multiple Live Births   0 0 0 0 0 2      # Outcome Date GA Lbr Pieter/2nd Weight Sex Type Anes PTL Lv   2 Term 14 37w0d  1.417 kg (3 lb 2 oz) M CS-LTranv   SURJIT      Name: Mark      Apgar1: 8  Apgar5: 9   1 Term 09/15/12 41w4d  3.204 kg (7 lb 1 oz) M CS-LTranv   SURJIT      Birth Comments: circ 12 Dr Liao      Name: Gerda      Apgar1: 7  Apgar5: 9       Past Medical History:   Diagnosis Date    Arthritis     Atypical squamous cells of undetermined significance (ASCUS) on Papanicolaou smear of cervix 2018    HPV+    History of vaginal bleeding     constant bled for 5 yrs cause unknown    LGSIL on Pap smear of cervix 2021    hpv+    Migraine headache 07/10/2012    Osteoarthritis     with rheamuatoid arthritis    Ovarian cyst     Psoriasis     Renal lithiasis     Hx of kidney stones    Tachycardia     acute tachycardia.  Had 2 ablations    Thyroid disease     treated as teen.  No problem since then    Varicosities        Past Surgical History:   Procedure Laterality Date    APPENDECTOMY      CARDIAC SURGERY      x 2 for tacycardia resting heart rate now

## 2025-07-26 LAB
CANDIDA SPECIES: NEGATIVE
GARDNERELLA VAGINALIS: NEGATIVE
SOURCE: NORMAL
TRICHOMONAS: NEGATIVE

## 2025-08-28 DIAGNOSIS — Z12.31 SCREENING MAMMOGRAM FOR BREAST CANCER: ICD-10-CM
